# Patient Record
Sex: FEMALE | Race: ASIAN | NOT HISPANIC OR LATINO | Employment: PART TIME | URBAN - METROPOLITAN AREA
[De-identification: names, ages, dates, MRNs, and addresses within clinical notes are randomized per-mention and may not be internally consistent; named-entity substitution may affect disease eponyms.]

---

## 2018-10-02 ENCOUNTER — TRANSCRIBE ORDERS (OUTPATIENT)
Dept: ADMINISTRATIVE | Facility: HOSPITAL | Age: 63
End: 2018-10-02

## 2018-10-02 DIAGNOSIS — Z80.41 FAMILY HISTORY OF MALIGNANT NEOPLASM OF OVARY: ICD-10-CM

## 2018-10-02 DIAGNOSIS — Z12.31 VISIT FOR SCREENING MAMMOGRAM: Primary | ICD-10-CM

## 2018-10-02 DIAGNOSIS — Z80.0 FAMILY HISTORY OF MALIGNANT NEOPLASM OF GASTROINTESTINAL TRACT: ICD-10-CM

## 2018-10-22 ENCOUNTER — APPOINTMENT (OUTPATIENT)
Dept: ULTRASOUND IMAGING | Facility: HOSPITAL | Age: 63
End: 2018-10-22
Payer: COMMERCIAL

## 2018-10-22 ENCOUNTER — TRANSCRIBE ORDERS (OUTPATIENT)
Dept: ADMINISTRATIVE | Facility: HOSPITAL | Age: 63
End: 2018-10-22

## 2018-10-22 ENCOUNTER — HOSPITAL ENCOUNTER (OUTPATIENT)
Dept: BONE DENSITY | Facility: IMAGING CENTER | Age: 63
Discharge: HOME | End: 2018-10-22
Payer: COMMERCIAL

## 2018-10-22 ENCOUNTER — APPOINTMENT (OUTPATIENT)
Dept: LAB | Facility: HOSPITAL | Age: 63
End: 2018-10-22
Payer: COMMERCIAL

## 2018-10-22 ENCOUNTER — HOSPITAL ENCOUNTER (OUTPATIENT)
Dept: ULTRASOUND IMAGING | Facility: HOSPITAL | Age: 63
Discharge: HOME | End: 2018-10-22
Payer: COMMERCIAL

## 2018-10-22 DIAGNOSIS — Z80.41 FAMILY HISTORY OF MALIGNANT NEOPLASM OF OVARY: ICD-10-CM

## 2018-10-22 DIAGNOSIS — Z12.31 VISIT FOR SCREENING MAMMOGRAM: ICD-10-CM

## 2018-10-22 DIAGNOSIS — Z80.0 FAMILY HISTORY OF MALIGNANT NEOPLASM OF GASTROINTESTINAL TRACT: Primary | ICD-10-CM

## 2018-10-22 DIAGNOSIS — Z80.0 FAMILY HISTORY OF MALIGNANT NEOPLASM OF GASTROINTESTINAL TRACT: ICD-10-CM

## 2018-10-22 LAB — CANCER AG125 SERPL-ACNC: 10.9 U/ML (ref 0–30)

## 2018-10-22 PROCEDURE — 36415 COLL VENOUS BLD VENIPUNCTURE: CPT

## 2018-10-22 PROCEDURE — 86304 IMMUNOASSAY TUMOR CA 125: CPT

## 2018-10-22 PROCEDURE — 76856 US EXAM PELVIC COMPLETE: CPT

## 2018-10-22 PROCEDURE — 77067 SCR MAMMO BI INCL CAD: CPT

## 2018-10-22 PROCEDURE — 76830 TRANSVAGINAL US NON-OB: CPT

## 2019-01-02 ENCOUNTER — TRANSCRIBE ORDERS (OUTPATIENT)
Dept: ADMINISTRATIVE | Facility: HOSPITAL | Age: 64
End: 2019-01-02

## 2019-01-02 DIAGNOSIS — Z80.41 FAMILY HISTORY OF MALIGNANT NEOPLASM OF OVARY: Primary | ICD-10-CM

## 2019-01-26 ENCOUNTER — HOSPITAL ENCOUNTER (OUTPATIENT)
Dept: ULTRASOUND IMAGING | Facility: HOSPITAL | Age: 64
Discharge: HOME | End: 2019-01-26
Payer: COMMERCIAL

## 2019-01-26 DIAGNOSIS — Z80.41 FAMILY HISTORY OF MALIGNANT NEOPLASM OF OVARY: ICD-10-CM

## 2019-01-26 PROCEDURE — 76856 US EXAM PELVIC COMPLETE: CPT

## 2019-01-26 PROCEDURE — 76830 TRANSVAGINAL US NON-OB: CPT

## 2019-10-15 ENCOUNTER — TRANSCRIBE ORDERS (OUTPATIENT)
Dept: ADMINISTRATIVE | Facility: HOSPITAL | Age: 64
End: 2019-10-15

## 2019-10-15 DIAGNOSIS — N83.291 COMPLEX CYST OF RIGHT OVARY: ICD-10-CM

## 2019-10-15 DIAGNOSIS — Z80.41 FAMILY HISTORY OF MALIGNANT NEOPLASM OF OVARY: Primary | ICD-10-CM

## 2019-10-15 DIAGNOSIS — Z12.31 VISIT FOR SCREENING MAMMOGRAM: Primary | ICD-10-CM

## 2019-10-24 ENCOUNTER — APPOINTMENT (OUTPATIENT)
Dept: LAB | Facility: HOSPITAL | Age: 64
End: 2019-10-24
Payer: COMMERCIAL

## 2019-10-24 ENCOUNTER — HOSPITAL ENCOUNTER (OUTPATIENT)
Dept: ULTRASOUND IMAGING | Facility: HOSPITAL | Age: 64
Discharge: HOME | End: 2019-10-24
Payer: COMMERCIAL

## 2019-10-24 ENCOUNTER — TRANSCRIBE ORDERS (OUTPATIENT)
Dept: ADMINISTRATIVE | Facility: HOSPITAL | Age: 64
End: 2019-10-24

## 2019-10-24 DIAGNOSIS — Z80.41 FAMILY HISTORY OF MALIGNANT NEOPLASM OF OVARY: Primary | ICD-10-CM

## 2019-10-24 DIAGNOSIS — N83.291 COMPLEX CYST OF RIGHT OVARY: ICD-10-CM

## 2019-10-24 DIAGNOSIS — Z80.41 FAMILY HISTORY OF MALIGNANT NEOPLASM OF OVARY: ICD-10-CM

## 2019-10-24 LAB — CANCER AG125 SERPL-ACNC: 11.2 U/ML (ref 0–30)

## 2019-10-24 PROCEDURE — 86304 IMMUNOASSAY TUMOR CA 125: CPT

## 2019-10-24 PROCEDURE — 76830 TRANSVAGINAL US NON-OB: CPT

## 2019-10-24 PROCEDURE — 76856 US EXAM PELVIC COMPLETE: CPT

## 2019-10-24 PROCEDURE — 36415 COLL VENOUS BLD VENIPUNCTURE: CPT

## 2019-11-02 ENCOUNTER — HOSPITAL ENCOUNTER (OUTPATIENT)
Dept: MAMMOGRAPHY | Facility: CLINIC | Age: 64
Discharge: HOME | End: 2019-11-02
Payer: COMMERCIAL

## 2019-11-02 VITALS — WEIGHT: 138 LBS | BODY MASS INDEX: 26.06 KG/M2 | HEIGHT: 61 IN

## 2019-11-02 DIAGNOSIS — Z12.31 VISIT FOR SCREENING MAMMOGRAM: ICD-10-CM

## 2019-11-02 PROCEDURE — 77067 SCR MAMMO BI INCL CAD: CPT

## 2019-11-02 PROCEDURE — 77063 BREAST TOMOSYNTHESIS BI: CPT

## 2020-10-05 ENCOUNTER — TRANSCRIBE ORDERS (OUTPATIENT)
Dept: ADMINISTRATIVE | Facility: HOSPITAL | Age: 65
End: 2020-10-05

## 2020-10-05 DIAGNOSIS — Z13.71 ENCOUNTER FOR NONPROCREATIVE SCREENING FOR GENETIC DISEASE CARRIER STATUS: ICD-10-CM

## 2020-10-05 DIAGNOSIS — N83.291 COMPLEX CYST OF RIGHT OVARY: ICD-10-CM

## 2020-10-05 DIAGNOSIS — Z12.31 ENCOUNTER FOR SCREENING MAMMOGRAM FOR MALIGNANT NEOPLASM OF BREAST: Primary | ICD-10-CM

## 2020-10-05 DIAGNOSIS — Z13.71 TESTING OF FEMALE FOR GENETIC DISEASE CARRIER STATUS: ICD-10-CM

## 2020-10-05 DIAGNOSIS — N83.291 OTHER OVARIAN CYST, RIGHT SIDE: ICD-10-CM

## 2020-10-09 ENCOUNTER — LAB (OUTPATIENT)
Dept: LAB | Facility: CLINIC | Age: 65
End: 2020-10-09
Payer: COMMERCIAL

## 2020-10-09 ENCOUNTER — HOSPITAL ENCOUNTER (OUTPATIENT)
Dept: ULTRASOUND IMAGING | Facility: CLINIC | Age: 65
Discharge: HOME | End: 2020-10-09
Payer: COMMERCIAL

## 2020-10-09 DIAGNOSIS — Z13.71 ENCOUNTER FOR NONPROCREATIVE SCREENING FOR GENETIC DISEASE CARRIER STATUS: ICD-10-CM

## 2020-10-09 DIAGNOSIS — N83.291 OTHER OVARIAN CYST, RIGHT SIDE: ICD-10-CM

## 2020-10-09 DIAGNOSIS — Z13.71 TESTING OF FEMALE FOR GENETIC DISEASE CARRIER STATUS: ICD-10-CM

## 2020-10-09 DIAGNOSIS — N83.291 COMPLEX CYST OF RIGHT OVARY: ICD-10-CM

## 2020-10-09 PROCEDURE — 76830 TRANSVAGINAL US NON-OB: CPT

## 2020-10-09 PROCEDURE — 76856 US EXAM PELVIC COMPLETE: CPT

## 2020-10-09 PROCEDURE — 36415 COLL VENOUS BLD VENIPUNCTURE: CPT

## 2020-10-09 PROCEDURE — 86304 IMMUNOASSAY TUMOR CA 125: CPT

## 2020-10-10 LAB — CANCER AG125 SERPL-ACNC: 10 U/ML (ref 0–30)

## 2020-12-10 ENCOUNTER — HOSPITAL ENCOUNTER (OUTPATIENT)
Dept: BONE DENSITY | Facility: IMAGING CENTER | Age: 65
Discharge: HOME | End: 2020-12-10
Payer: COMMERCIAL

## 2020-12-10 VITALS — WEIGHT: 135 LBS | HEIGHT: 61 IN | BODY MASS INDEX: 25.49 KG/M2

## 2020-12-10 DIAGNOSIS — Z12.31 ENCOUNTER FOR SCREENING MAMMOGRAM FOR MALIGNANT NEOPLASM OF BREAST: ICD-10-CM

## 2020-12-10 PROCEDURE — 77063 BREAST TOMOSYNTHESIS BI: CPT

## 2020-12-10 PROCEDURE — 77067 SCR MAMMO BI INCL CAD: CPT

## 2021-06-11 VITALS — HEIGHT: 61 IN | WEIGHT: 130 LBS | BODY MASS INDEX: 24.55 KG/M2

## 2021-06-11 DIAGNOSIS — Z80.0 FHX: COLON CANCER: Primary | ICD-10-CM

## 2021-06-11 RX ORDER — SODIUM PICOSULFATE, MAGNESIUM OXIDE, AND ANHYDROUS CITRIC ACID 10; 3.5; 12 MG/160ML; G/160ML; G/160ML
LIQUID ORAL
Qty: 320 ML | Refills: 0 | Status: SHIPPED | OUTPATIENT
Start: 2021-06-11 | End: 2021-06-18 | Stop reason: HOSPADM

## 2021-06-11 RX ORDER — OMEGA-3-ACID ETHYL ESTERS 1 G/1
CAPSULE, LIQUID FILLED ORAL EVERY 12 HOURS
COMMUNITY

## 2021-06-11 RX ORDER — SEMAGLUTIDE 1.34 MG/ML
INJECTION, SOLUTION SUBCUTANEOUS
COMMUNITY

## 2021-06-11 RX ORDER — LOSARTAN POTASSIUM 25 MG/1
TABLET ORAL EVERY 24 HOURS
COMMUNITY

## 2021-06-11 RX ORDER — METFORMIN HYDROCHLORIDE 750 MG/1
TABLET, EXTENDED RELEASE ORAL EVERY 24 HOURS
COMMUNITY

## 2021-06-11 NOTE — TELEPHONE ENCOUNTER
Why does your doctor want you to have this procedure? Fhx colon ca-brother    Do you have kidney disease?  no  If yes, are you on dialysis :     Have you had diverticulitis within the past 2 months? no    Are you diabetic?  yes  If yes, insulin dependent: NIDDM  If yes, provide diabetic instructions sheet     Do take iron supplements?  no  If yes, instruct patient to hold iron supplement for 7 days prior    Are you on a blood thinner? no   Was the blood thinner sheet complete and faxed to cardiologist no  Plavix (clopidogrel), Coumadin (warfarin), Lovenox (enoxaparin), Xarelto (rivaroxaban), Pradaxa(dabigatran), Eliquis(apixaban) Savaysa/Lixiana (edoxapan)    Do you have an automatic implantable cardiac defibrillator (AICD)/pacemaker ([facility])? no  Was AICD/pacemaker sheet completed and faxed to cardiologist? no    Are you on home oxygen? no  If yes, continuous or nocturnal:     Have you been treated for MRSA, VRE or any communicable diseases? no    Heart attack, stroke, or stent within 3 months? no  Schedule at Hospital if within 3-6 months   Use nitroglycerin for chest pain in the last 6 months? no    History of organ  transplant?  no   If yes, notify Endo      History of neck/throat/tongue surgery or cancer? no  IF yes, notify Endo      Any problems with anesthesia in the past? no     Was stool C diff ordered?  no Stool specimen needs to be completed prior to procedure    Do have any facial or body piercings?no     Do you have a latex allergy? no     Do have an allergy to metals? (Bravo study only) no     If pediatric patient, was consent faxed to pediatrician no     Patient rights reviewed yes     Colon phone prep completed; clenpiq instructions reviewed and emailed to pt; rx forwarded to provider

## 2021-06-18 ENCOUNTER — ANESTHESIA (OUTPATIENT)
Dept: GASTROENTEROLOGY | Facility: AMBULATORY SURGERY CENTER | Age: 66
End: 2021-06-18

## 2021-06-18 ENCOUNTER — ANESTHESIA EVENT (OUTPATIENT)
Dept: GASTROENTEROLOGY | Facility: AMBULATORY SURGERY CENTER | Age: 66
End: 2021-06-18

## 2021-06-18 ENCOUNTER — HOSPITAL ENCOUNTER (OUTPATIENT)
Dept: GASTROENTEROLOGY | Facility: AMBULATORY SURGERY CENTER | Age: 66
Discharge: HOME | End: 2021-06-18
Payer: COMMERCIAL

## 2021-06-18 VITALS
OXYGEN SATURATION: 98 % | HEART RATE: 72 BPM | TEMPERATURE: 97.4 F | RESPIRATION RATE: 23 BRPM | DIASTOLIC BLOOD PRESSURE: 87 MMHG | SYSTOLIC BLOOD PRESSURE: 117 MMHG

## 2021-06-18 DIAGNOSIS — Z80.0 FAMILY HISTORY OF COLON CANCER: ICD-10-CM

## 2021-06-18 PROCEDURE — G0105 COLORECTAL SCRN; HI RISK IND: HCPCS | Performed by: INTERNAL MEDICINE

## 2021-06-18 RX ORDER — PROPOFOL 10 MG/ML
INJECTION, EMULSION INTRAVENOUS CONTINUOUS PRN
Status: DISCONTINUED | OUTPATIENT
Start: 2021-06-18 | End: 2021-06-18

## 2021-06-18 RX ORDER — PROPOFOL 10 MG/ML
INJECTION, EMULSION INTRAVENOUS AS NEEDED
Status: DISCONTINUED | OUTPATIENT
Start: 2021-06-18 | End: 2021-06-18

## 2021-06-18 RX ORDER — LIDOCAINE HYDROCHLORIDE 10 MG/ML
INJECTION, SOLUTION EPIDURAL; INFILTRATION; INTRACAUDAL; PERINEURAL AS NEEDED
Status: DISCONTINUED | OUTPATIENT
Start: 2021-06-18 | End: 2021-06-18

## 2021-06-18 RX ORDER — SODIUM CHLORIDE, SODIUM LACTATE, POTASSIUM CHLORIDE, CALCIUM CHLORIDE 600; 310; 30; 20 MG/100ML; MG/100ML; MG/100ML; MG/100ML
50 INJECTION, SOLUTION INTRAVENOUS CONTINUOUS
Status: DISCONTINUED | OUTPATIENT
Start: 2021-06-18 | End: 2021-06-22 | Stop reason: HOSPADM

## 2021-06-18 RX ADMIN — SODIUM CHLORIDE, SODIUM LACTATE, POTASSIUM CHLORIDE, CALCIUM CHLORIDE 50 ML/HR: 600; 310; 30; 20 INJECTION, SOLUTION INTRAVENOUS at 08:44

## 2021-06-18 RX ADMIN — SODIUM CHLORIDE, SODIUM LACTATE, POTASSIUM CHLORIDE, CALCIUM CHLORIDE: 600; 310; 30; 20 INJECTION, SOLUTION INTRAVENOUS at 09:05

## 2021-06-18 RX ADMIN — LIDOCAINE HYDROCHLORIDE 50 MG: 10 INJECTION, SOLUTION EPIDURAL; INFILTRATION; INTRACAUDAL; PERINEURAL at 09:09

## 2021-06-18 RX ADMIN — PROPOFOL 80 MCG/KG/MIN: 10 INJECTION, EMULSION INTRAVENOUS at 09:09

## 2021-06-18 RX ADMIN — PROPOFOL 100 MG: 10 INJECTION, EMULSION INTRAVENOUS at 09:09

## 2021-06-18 NOTE — ANESTHESIA POSTPROCEDURE EVALUATION
Post-Op Assessment Note    CV Status:  Stable  Pain Score: 0    Pain management: adequate     Mental Status:  Alert and awake   Hydration Status:  Euvolemic   PONV Controlled:  Controlled   Airway Patency:  Patent      Post Op Vitals Reviewed: Yes      Staff: CRNA         No complications documented      BP   103/61   Temp     Pulse  88   Resp   17   SpO2   98% Pt now rates pain as 5/10, appears comfortable.

## 2021-06-18 NOTE — H&P
History and Physical - [facility]  Nancy Mcintosh 61 y o  female MRN: [id number]    HPI: Nancy Mcintosh is a 61y o  year old female who presents for colonoscopy for family history of colon cancer    REVIEW OF SYSTEMS: Per the HPI, and otherwise unremarkable      Historical Information   Past Medical History:   Diagnosis Date    Diabetes mellitus ([address] )     Hyperlipidemia     Hypertension      Past Surgical History:   Procedure Laterality Date    COLONOSCOPY      HYSTERECTOMY       Social History   Social History     Substance and Sexual Activity   Alcohol Use Yes     Social History     Substance and Sexual Activity   Drug Use Never     Social History     Tobacco Use   Smoking Status Never Smoker   Smokeless Tobacco Never Used     Family History   Problem Relation Age of Onset    Ovarian cancer Mother 61    No Known Problems Father     No Known Problems Sister     No Known Problems Maternal Grandmother     No Known Problems Maternal Grandfather     No Known Problems Paternal Grandmother     No Known Problems Paternal Grandfather     No Known Problems Sister     No Known Problems Sister     No Known Problems Sister     No Known Problems Sister     No Known Problems Maternal Aunt     No Known Problems Maternal Aunt     No Known Problems Maternal Aunt     No Known Problems Maternal Aunt     No Known Problems Maternal Aunt     No Known Problems Maternal Aunt     No Known Problems Paternal Aunt     No Known Problems Paternal Aunt     Colon cancer Brother 39       Meds/Allergies       Current Outpatient Medications:     losartan (COZAAR) 25 mg tablet    metFORMIN (GLUCOPHAGE-XR) 750 mg 24 hr tablet    omega-3-acid ethyl esters (LOVAZA) 1 g capsule    Rosuvastatin Calcium 10 MG CPSP    Sod Picosulfate-Mag Ox-Cit Acd (Clenpiq) 10-3 5-12 MG-GM -GM/160ML SOLN    Semaglutide,0 25 or 0 5MG/DOS, (Ozempic, 0 25 or 0 5 MG/DOSE,) 2 MG/1 5ML SOPN    Current Facility-Administered Medications:   lactated ringers infusion, 50 mL/hr, Intravenous, Continuous, 50 mL/hr at 06/18/21 0844    No Known Allergies    Objective     /91   Pulse 68   Temp (!) 97 4 °F (36 3 °C) (Temporal)   Resp 13   SpO2 99%     PHYSICAL EXAM    Gen: NAD AAOx3  Head: Normocephalic, Atraumatic  CV: S1S2 RRR no m/r/g  CHEST: Clear b/l no c/r/w  ABD: soft, +BS NT/ND  EXT: no edema    ASSESSMENT/PLAN:  This is a 61y o  year old female here for colonoscopy, and she is stable and optimized for her procedure

## 2021-06-18 NOTE — DISCHARGE INSTRUCTIONS
Colonoscopy   WHAT YOU NEED TO KNOW:   A colonoscopy is a procedure to examine the inside of your colon (intestine) with a scope  Polyps or tissue growths may have been removed during your colonoscopy  It is normal to feel bloated and to have some abdominal discomfort  You should be passing gas  If you have hemorrhoids or you had polyps removed, you may have a small amount of bleeding  DISCHARGE INSTRUCTIONS:   Seek care immediately if:    You have sudden, severe abdominal pain   You have problems swallowing   You have a large amount of black, sticky bowel movements or blood in your bowel movements   You have sudden trouble breathing   You feel weak, lightheaded, or faint or your heart beats faster than normal for you  Contact your healthcare provider if:    You have a fever and chills   You have nausea or are vomiting   Your abdomen is bloated or feels full and hard   You have abdominal pain   You have black, sticky bowel movements or blood in your bowel movements   You have not had a bowel movement for 3 days after your procedure   You have rash or hives   You have questions or concerns about your procedure  Activity:    Do not lift, strain, or run for 24 hours after your procedure   Rest after your procedure  You have been given medicine to relax you  Do not drive or make important decisions until the day after your procedure  Return to your normal activity as directed   Relieve gas and discomfort from bloating by lying on your right side with a heating pad on your abdomen  You may need to take short walks to help the gas move out  Eat small meals until bloating is relieved  Follow up with your healthcare provider as directed: Write down your questions so you remember to ask them during your visits  If you take a blood thinner, please review the specific instructions from your endoscopist about when you should resume it   These can be found in the Recommendation and Your Medication list sections of this After Visit Summary  Diverticulosis   WHAT YOU NEED TO KNOW:   What is diverticulosis? Diverticulosis is a condition that causes small pockets called diverticula to form in your intestine  These pockets make it difficult for bowel movements to pass through your digestive system  What causes diverticulosis? Diverticula form when muscles have to work hard to move bowel movements through the intestine  The force causes bulges to form at weak areas in the intestine  This may happen if you eat foods that are low in fiber  Fiber helps give your bowel movements more bulk so they are larger and easier to move through your colon  The following may increase your risk of diverticulosis:  · A history of constipation    · Age 36 or older    · Obesity    · Lack of exercise    What are the signs and symptoms of diverticulosis? Diverticulosis usually does not cause any signs or symptoms  It may cause any of the following in some people:  · Pain or discomfort in your lower abdomen    · Abdominal bloating    · Constipation or diarrhea    How is diverticulosis diagnosed? Your healthcare provider will examine you and ask about your bowel movements, diet, and symptoms  He or she will also ask about any medical conditions you have or medicines you take  You may need any of the following:  · Blood tests  may be done to check for signs of inflammation  · A barium enema  is an x-ray of your colon that may show diverticula  A tube is put into your anus, and a liquid called barium is put through the tube  Barium is used so that healthcare providers can see your colon more clearly  · Flexible sigmoidoscopy  is a test to look for any changes in your lower intestines and rectum  It may also show the cause of any bleeding or pain  A soft, bendable tube with a light on the end will be put into your anus  It will then be moved forward into your intestine  · A colonoscopy  is used to look at your whole colon  A scope (long bendable tube with a light on the end) is used to take pictures  This test may show diverticula  · A CT scan , or CAT scan, may show diverticula  You may be given contrast liquid before the scan  Tell the healthcare provider if you have ever had an allergic reaction to contrast liquid  How is diverticulosis managed? The goal of treatment is to manage any symptoms you have and prevent other problems such as diverticulitis  Diverticulitis is swelling or infection of the diverticula  Your healthcare provider may recommend any of the following:  · Eat a variety of high-fiber foods  High-fiber foods help you have regular bowel movements  High-fiber foods include cooked beans, fruits, vegetables, and some cereals  Most adults need 25 to 35 grams of fiber each day  Your healthcare provider may recommend that you have more  Ask your healthcare provider how much fiber you need  Increase fiber slowly  You may have abdominal discomfort, bloating, and gas if you add fiber to your diet too quickly  You may need to take a fiber supplement if you are not getting enough fiber from food  · Medicines  to soften your bowel movements may be given  You may also need medicines to treat symptoms such as bloating and pain  · Drink liquids as directed  You may need to drink 2 to 3 liters (8 to 12 cups) of liquids every day  Ask your healthcare provider how much liquid to drink each day and which liquids are best for you  · Apply heat  on your abdomen for 20 to 30 minutes every 2 hours for as many days as directed  Heat helps decrease pain and muscle spasms  How can I help prevent diverticulitis or other symptoms? The following may help decrease your risk for diverticulitis or symptoms, such as bleeding  Talk to your provider about these or other things you can do to prevent problems that may occur with diverticulosis  · Exercise regularly  Ask your healthcare provider about the best exercise plan for you  Exercise can help you have regular bowel movements  Get 30 minutes of exercise on most days of the week  · Maintain a healthy weight  Ask your healthcare provider how much you should weigh  Ask him or her to help you create a weight loss plan if you are overweight  · Do not smoke  Nicotine and other chemicals in cigarettes increase your risk for diverticulitis  Ask your healthcare provider for information if you currently smoke and need help to quit  E-cigarettes or smokeless tobacco still contain nicotine  Talk to your healthcare provider before you use these products  · Ask your healthcare provider if it is safe to take NSAIDs  NSAIDs may increase your risk of diverticulitis  When should I seek immediate care? · You have severe pain on the left side of your lower abdomen  · Your bowel movements are bright or dark red  When should I contact my healthcare provider? · You have a fever and chills  · You feel dizzy or lightheaded  · You have nausea, or you are vomiting  · You have a change in your bowel movements  · You have questions or concerns about your condition or care  CARE AGREEMENT:   You have the right to help plan your care  Learn about your health condition and how it may be treated  Discuss treatment options with your healthcare providers to decide what care you want to receive  You always have the right to refuse treatment  The above information is an  only  It is not intended as medical advice for individual conditions or treatments  Talk to your doctor, nurse or pharmacist before following any medical regimen to see if it is safe and effective for you  © Copyright [address] Information is for End User's use only and may not be sold, redistributed or otherwise used for commercial purposes   All illustrations and images included in CareNotes® are the copyrighted property of [employer]  or Tano [employer]   Hemorrhoids   WHAT YOU NEED TO KNOW:   What are hemorrhoids? Hemorrhoids are swollen blood vessels inside your rectum (internal hemorrhoids) or on your anus (external hemorrhoids)  Sometimes a hemorrhoid may prolapse  This means it extends out of your anus  What increases my risk for hemorrhoids? · Pregnancy or obesity    · Straining or sitting for a long time during bowel movements    · Liver disease    · Weak muscles around the anus caused by older age, rectal surgery, or anal intercourse    · A lack of physical activity    · Chronic diarrhea or constipation    · A low-fiber diet    What are the signs and symptoms of hemorrhoids? · Pain or itching around your anus or inside your rectum    · Swelling or bumps around your anus    · Bright red blood in your bowel movement, on the toilet paper, or in the toilet bowl    · Tissue bulging out of your anus (prolapsed hemorrhoids)    · Incontinence (poor control over urine or bowel movements)    How are hemorrhoids diagnosed? Your healthcare provider will ask about your symptoms, the foods you eat, and your bowel movements  He or she will examine your anus for external hemorrhoids  You may need the following:  · A digital rectal exam  is a test to check for hemorrhoids  Your healthcare provider will put a gloved finger inside your anus to feel for the hemorrhoids  · An anoscopy  is a test that uses a scope (small tube with a light and camera on the end) to look at your hemorrhoids  How are hemorrhoids treated? Treatment will depend on your symptoms  You may need any of the following:  · Medicines  can help decrease pain and swelling, and soften your bowel movement  The medicine may be a pill, pad, cream, or ointment  · Procedures  may be used to shrink or remove your hemorrhoid  Examples include rubber-band ligation, sclerotherapy, and photocoagulation   These procedures may be done in your healthcare provider's office  Ask your healthcare provider for more information about these procedures  · Surgery  may be needed to shrink or remove your hemorrhoids  How can I manage my symptoms? · Apply ice on your anus for 15 to 20 minutes every hour or as directed  Use an ice pack, or put crushed ice in a plastic bag  Cover it with a towel before you apply it to your anus  Ice helps prevent tissue damage and decreases swelling and pain  · Take a sitz bath  Fill a bathtub with 4 to 6 inches of warm water  You may also use a sitz bath pan that fits inside a toilet bowl  Sit in the sitz bath for 15 minutes  Do this 3 times a day, and after each bowel movement  The warm water can help decrease pain and swelling  · Keep your anal area clean  Gently wash the area with warm water daily  Soap may irritate the area  After a bowel movement, wipe with moist towelettes or wet toilet paper  Dry toilet paper can irritate the area  How can I help prevent hemorrhoids? · Do not strain to have a bowel movement  Do not sit on the toilet too long  These actions can increase pressure on the tissues in your rectum and anus  · Drink plenty of liquids  Liquids can help prevent constipation  Ask how much liquid to drink each day and which liquids are best for you  · Eat a variety of high-fiber foods  Examples include fruits, vegetables, and whole grains  Ask your healthcare provider how much fiber you need each day  You may need to take a fiber supplement  · Exercise as directed  Exercise, such as walking, may make it easier to have a bowel movement  Ask your healthcare provider to help you create an exercise plan  · Do not have anal sex  Anal sex can weaken the skin around your rectum and anus  · Avoid heavy lifting  This can cause straining and increase your risk for another hemorrhoid  When should I seek immediate care? · You have severe pain in your rectum or around your anus      · You have severe pain in your abdomen and you are vomiting  · You have bleeding from your anus that soaks through your underwear  When should I contact my healthcare provider? · You have frequent and painful bowel movements  · Your hemorrhoid looks or feels more swollen than usual      · You do not have a bowel movement for 2 days or more  · You see or feel tissue coming through your anus  · You have questions or concerns about your condition or care  CARE AGREEMENT:   You have the right to help plan your care  Learn about your health condition and how it may be treated  Discuss treatment options with your healthcare providers to decide what care you want to receive  You always have the right to refuse treatment  The above information is an  only  It is not intended as medical advice for individual conditions or treatments  Talk to your doctor, nurse or pharmacist before following any medical regimen to see if it is safe and effective for you  © Copyright [address] Information is for End User's use only and may not be sold, redistributed or otherwise used for commercial purposes   All illustrations and images included in CareNotes® are the copyrighted property of [employer]  or [address] [place] [address]

## 2021-06-18 NOTE — ANESTHESIA PREPROCEDURE EVALUATION
Procedure:  COLONOSCOPY    Relevant Problems   CARDIO   (+) Hyperlipidemia   (+) Hypertension      Other   (+) Diabetes mellitus ([facility] [address] )        Physical Exam    Airway    Mallampati score: II  TM Distance: >3 FB  Neck ROM: full     Dental   upper dentures,     Cardiovascular  Rhythm: regular, Rate: normal, Cardiovascular exam normal    Pulmonary  Pulmonary exam normal Breath sounds clear to auscultation,     Other Findings        Anesthesia Plan  ASA Score- 2     Anesthesia Type- IV sedation with anesthesia with ASA Monitors  Additional Monitors:   Airway Plan:           Plan Factors-Exercise tolerance (METS): >4 METS  Chart reviewed  Patient summary reviewed  Patient is not a current smoker  Induction- intravenous  Postoperative Plan-     Informed Consent- Anesthetic plan and risks discussed with patient  I personally reviewed this patient with the CRNA  Discussed and agreed on the Anesthesia Plan with the CRNA  Cullen Hale

## 2021-10-14 ENCOUNTER — TELEPHONE (OUTPATIENT)
Dept: OBGYN CLINIC | Facility: CLINIC | Age: 66
End: 2021-10-14

## 2021-10-14 DIAGNOSIS — Z12.31 ENCOUNTER FOR SCREENING MAMMOGRAM FOR MALIGNANT NEOPLASM OF BREAST: ICD-10-CM

## 2021-10-14 DIAGNOSIS — N83.291 OTHER OVARIAN CYST, RIGHT SIDE: Primary | ICD-10-CM

## 2021-12-17 PROBLEM — Z90.710 HISTORY OF HYSTERECTOMY: Status: ACTIVE | Noted: 2021-12-17

## 2021-12-17 PROBLEM — Z13.71 BRCA NEGATIVE: Status: ACTIVE | Noted: 2021-12-17

## 2021-12-17 PROBLEM — Z80.0 FAMILY HISTORY OF COLON CANCER: Status: ACTIVE | Noted: 2021-12-17

## 2021-12-17 PROBLEM — Z80.41 FAMILY HISTORY OF OVARIAN CANCER: Status: ACTIVE | Noted: 2021-12-17

## 2021-12-17 PROBLEM — Z87.42 HISTORY OF OVARIAN CYST: Status: ACTIVE | Noted: 2021-12-17

## 2021-12-21 ENCOUNTER — ANNUAL EXAM (OUTPATIENT)
Dept: OBGYN CLINIC | Facility: CLINIC | Age: 66
End: 2021-12-21

## 2021-12-21 VITALS
BODY MASS INDEX: 23.98 KG/M2 | SYSTOLIC BLOOD PRESSURE: 126 MMHG | DIASTOLIC BLOOD PRESSURE: 80 MMHG | WEIGHT: 127 LBS | HEIGHT: 61 IN

## 2021-12-21 DIAGNOSIS — Z78.0 ASYMPTOMATIC MENOPAUSAL STATE: ICD-10-CM

## 2021-12-21 DIAGNOSIS — Z01.419 ENCOUNTER FOR GYNECOLOGICAL EXAMINATION (GENERAL) (ROUTINE) WITHOUT ABNORMAL FINDINGS: Primary | ICD-10-CM

## 2021-12-21 DIAGNOSIS — Z80.41 FAMILY HISTORY OF OVARIAN CANCER: ICD-10-CM

## 2021-12-21 DIAGNOSIS — Z12.31 ENCOUNTER FOR SCREENING MAMMOGRAM FOR BREAST CANCER: ICD-10-CM

## 2021-12-21 DIAGNOSIS — Z80.0 FAMILY HISTORY OF COLON CANCER: ICD-10-CM

## 2021-12-21 DIAGNOSIS — Z90.710 HISTORY OF HYSTERECTOMY: ICD-10-CM

## 2021-12-21 DIAGNOSIS — Z13.820 OSTEOPOROSIS SCREENING: ICD-10-CM

## 2021-12-21 DIAGNOSIS — Z13.71 BRCA NEGATIVE: ICD-10-CM

## 2021-12-21 DIAGNOSIS — Z87.42 HISTORY OF OVARIAN CYST: ICD-10-CM

## 2021-12-21 PROCEDURE — 99396 PREV VISIT EST AGE 40-64: CPT | Performed by: OBSTETRICS & GYNECOLOGY

## 2022-10-10 ENCOUNTER — TELEPHONE (OUTPATIENT)
Dept: OBGYN CLINIC | Facility: CLINIC | Age: 67
End: 2022-10-10

## 2022-10-10 DIAGNOSIS — Z80.41 FAMILY HISTORY OF OVARIAN CANCER: Primary | ICD-10-CM

## 2022-10-10 NOTE — TELEPHONE ENCOUNTER
Order placed, look on nursing printer please  Not due until Dec    Also due for well check in December, please have pt schedule    Thanks

## 2022-10-10 NOTE — TELEPHONE ENCOUNTER
Sent message to [employer] desk to print CA-125 order and have pt schedule December [place]  Maury Gil

## 2022-10-10 NOTE — TELEPHONE ENCOUNTER
Chelsea Felix walked into [facility] to  her Mammo & Dexa Scan orders  Chelsea Felix was also requesting CA-125 lab orders  Will send message to Dr Luan Bhatia

## 2022-10-10 NOTE — TELEPHONE ENCOUNTER
10/10/22-Pt. Stopped in looking for Mammo Rx and Dexa Rx, printed both. She also asked about a CA-125 order.  Please follow up.  Thank you.

## 2022-10-14 ENCOUNTER — APPOINTMENT (OUTPATIENT)
Dept: LAB | Facility: HOSPITAL | Age: 67
End: 2022-10-14
Attending: OBSTETRICS & GYNECOLOGY
Payer: COMMERCIAL

## 2022-10-14 ENCOUNTER — HOSPITAL ENCOUNTER (OUTPATIENT)
Dept: ULTRASOUND IMAGING | Facility: HOSPITAL | Age: 67
End: 2022-10-14
Attending: OBSTETRICS & GYNECOLOGY
Payer: COMMERCIAL

## 2022-10-14 DIAGNOSIS — N83.291 OTHER OVARIAN CYST, RIGHT SIDE: ICD-10-CM

## 2022-10-14 DIAGNOSIS — Z80.41 FAMILY HISTORY OF OVARIAN CANCER: ICD-10-CM

## 2022-10-14 LAB — CANCER AG125 SERPL-ACNC: 9.7 U/ML (ref 0–30)

## 2022-10-14 PROCEDURE — 86304 IMMUNOASSAY TUMOR CA 125: CPT

## 2022-10-14 PROCEDURE — 76830 TRANSVAGINAL US NON-OB: CPT

## 2022-10-14 PROCEDURE — 76856 US EXAM PELVIC COMPLETE: CPT

## 2022-10-14 PROCEDURE — 36415 COLL VENOUS BLD VENIPUNCTURE: CPT

## 2022-10-19 ENCOUNTER — TELEPHONE (OUTPATIENT)
Dept: OBGYN CLINIC | Facility: CLINIC | Age: 67
End: 2022-10-19

## 2022-10-27 ENCOUNTER — HOSPITAL ENCOUNTER (OUTPATIENT)
Dept: BONE DENSITY | Facility: CLINIC | Age: 67
Discharge: HOME | End: 2022-10-27
Payer: COMMERCIAL

## 2022-10-27 ENCOUNTER — HOSPITAL ENCOUNTER (OUTPATIENT)
Dept: MAMMOGRAPHY | Facility: CLINIC | Age: 67
Discharge: HOME | End: 2022-10-27
Payer: COMMERCIAL

## 2022-10-27 VITALS — HEIGHT: 61 IN | BODY MASS INDEX: 23.6 KG/M2 | WEIGHT: 125 LBS

## 2022-10-27 DIAGNOSIS — Z13.820 OSTEOPOROSIS SCREENING: ICD-10-CM

## 2022-10-27 DIAGNOSIS — Z12.31 ENCOUNTER FOR SCREENING MAMMOGRAM FOR BREAST CANCER: ICD-10-CM

## 2022-10-27 DIAGNOSIS — Z78.0 ASYMPTOMATIC MENOPAUSAL STATE: ICD-10-CM

## 2022-10-27 PROCEDURE — 77063 BREAST TOMOSYNTHESIS BI: CPT

## 2022-10-27 PROCEDURE — 77080 DXA BONE DENSITY AXIAL: CPT

## 2022-10-27 PROCEDURE — 77067 SCR MAMMO BI INCL CAD: CPT

## 2023-01-07 NOTE — PROGRESS NOTES
Assessment/Plan:    Encounter for gynecological examination (general) (routine) without abnormal findings  All well, no complaints  Normal breast and pelvic exams  Questionable right thyroid nodule on exam, u/s ordered  S/p hysterectomy  Mammo order given, last 10/27/22  Colonoscopy 2021, due 2026  Dexa 10/27/22, WNL  Repeat 5 years    Family history of ovarian cancer - mother in 62s  Discussed with the patient the lack of screening tests available for ovarian cancer  No data to support pelvic ultrasound or ca-125 "screening" as a method to detect disease earlier or change prognosis after diagnosis  Recommend awareness of symptoms and to contact for new persistent abdominal or pelvic pain, new progressive bloating, problems eating (early satiety, getting full too fast), or constant urinary pressure  Would like to continue with annual ca125  Orders given    History of right ovarian cyst - stable  Stable, nonsuspicious cyst last imaged 10/2022  Will continue with annual u/s, orders given  Diagnoses and all orders for this visit:    Encounter for gynecological examination (general) (routine) without abnormal findings    Breast cancer screening by mammogram  -     Mammo screening bilateral w 3d & cad; Future    History of right ovarian cyst - stable  -     US pelvis complete w transvaginal; Future    Thyroid nodule  -     US thyroid; Future    Family history of ovarian cancer - mother in 62s  -     CA 80; Future  -         Neoplasm of uncertain behavior of pelvis  -     ; Future  -         [facility] - [facility]    Other orders  -     clobetasol (TEMOVATE) 0 05 % external solution; APPLY SOLUTION TO SCALP TOPICALLY EVERY OTHER DAY  -     cyanocobalamin 1,000 mcg/mL; INJECT 1000 MCG INTRAMUSCULARLY MONTHLY          Subjective:      Patient ID: Lexi Munroe is a 74 y o  female  HPI Here for well check        The following portions of the patient's history were reviewed and updated as appropriate:   She  has a past medical history of Diabetes mellitus ([address] ), Hyperlipidemia, and Hypertension  She   Patient Active Problem List    Diagnosis Date Noted   • Encounter for gynecological examination (general) (routine) without abnormal findings 12/21/2021   • History of hysterectomy 12/17/2021   • BRCA negative - Negative [facility] 1/2018 12/17/2021   • Family history of ovarian cancer - mother in 62s 12/17/2021   • Family history of colon cancer - brother in 45s 12/17/2021   • History of right ovarian cyst - stable 12/17/2021   • Diabetes mellitus ([address] )    • Hyperlipidemia    • Hypertension      She  has a past surgical history that includes Colonoscopy (06/2021); Hysterectomy; and Mammo (historical) (Bilateral, 12/13/2021)  Her family history includes Colon cancer (age of onset: 39) in her brother; Diabetes in her maternal grandfather, maternal grandmother, and mother; Heart disease in her sister; Hypertension in her maternal grandfather and maternal grandmother; Kidney failure in her father; No Known Problems in her brother, brother, brother, maternal aunt, maternal aunt, maternal aunt, maternal aunt, maternal aunt, maternal aunt, paternal aunt, paternal aunt, paternal grandfather, paternal grandmother, sister, sister, sister, sister, son, and son; Ovarian cancer (age of onset: 61) in her mother  She  reports that she has never smoked  She has never used smokeless tobacco  She reports current alcohol use  She reports that she does not use drugs    Current Outpatient Medications   Medication Sig Dispense Refill   • clobetasol (TEMOVATE) 0 05 % external solution APPLY SOLUTION TO SCALP TOPICALLY EVERY OTHER DAY     • cyanocobalamin 1,000 mcg/mL INJECT 1000 MCG INTRAMUSCULARLY MONTHLY     • losartan (COZAAR) 25 mg tablet every 24 hours     • metFORMIN (GLUCOPHAGE-XR) 750 mg 24 hr tablet every 24 hours     • omega-3-acid ethyl esters (LOVAZA) 1 g capsule Every 12 hours     • Rosuvastatin Calcium 10 MG CPSP every 24 hours     • Semaglutide,0 25 or 0 5MG/DOS, (Ozempic, 0 25 or 0 5 MG/DOSE,) 2 MG/1 5ML SOPN        No current facility-administered medications for this visit  She has No Known Allergies       Review of Systems  No breast, bladder, bowel changes   No new persistent pain, bloating, early satiety or pelvic pressure      Objective:      /68 (BP Location: Left arm, Patient Position: Sitting, Cuff Size: Standard)   Ht 5' 0 5" (1 537 m)   Wt 56 2 kg (124 lb)   BMI 23 82 kg/m²          Physical Exam    General appearance: no distress, pleasant  Neck: thyroid with questionable right nodule, no thyromegaly, no palpable adenopathy  Lymph nodes: no palpable adenopathy  Breasts: no masses, nodes or skin changes  Abdomen: soft, non tender, no palpable masses  Pelvic exam: normal atrophic external genitalia, stable right bartholin cyst, nontender, urethral meatus normal, vagina atrophic without lesions, cuff intact, no adnexal masses, non tender  Rectal exam: normal sphincter tone, no masses, RV confirms above

## 2023-01-13 ENCOUNTER — ANNUAL EXAM (OUTPATIENT)
Dept: OBGYN CLINIC | Facility: CLINIC | Age: 68
End: 2023-01-13

## 2023-01-13 VITALS
WEIGHT: 124 LBS | DIASTOLIC BLOOD PRESSURE: 68 MMHG | BODY MASS INDEX: 23.41 KG/M2 | SYSTOLIC BLOOD PRESSURE: 102 MMHG | HEIGHT: 61 IN

## 2023-01-13 DIAGNOSIS — Z01.419 ENCOUNTER FOR GYNECOLOGICAL EXAMINATION (GENERAL) (ROUTINE) WITHOUT ABNORMAL FINDINGS: Primary | ICD-10-CM

## 2023-01-13 DIAGNOSIS — Z12.31 BREAST CANCER SCREENING BY MAMMOGRAM: ICD-10-CM

## 2023-01-13 DIAGNOSIS — Z80.41 FAMILY HISTORY OF OVARIAN CANCER: ICD-10-CM

## 2023-01-13 DIAGNOSIS — Z13.71 BRCA NEGATIVE: ICD-10-CM

## 2023-01-13 DIAGNOSIS — E04.1 THYROID NODULE: ICD-10-CM

## 2023-01-13 DIAGNOSIS — Z87.42 HISTORY OF OVARIAN CYST: ICD-10-CM

## 2023-01-13 DIAGNOSIS — D48.7: ICD-10-CM

## 2023-01-13 RX ORDER — CLOBETASOL PROPIONATE 0.46 MG/ML
SOLUTION TOPICAL
COMMUNITY
Start: 2022-10-19

## 2023-01-13 RX ORDER — CYANOCOBALAMIN 1000 UG/ML
INJECTION, SOLUTION INTRAMUSCULAR; SUBCUTANEOUS
COMMUNITY
Start: 2022-12-13

## 2023-01-13 NOTE — LETTER
January 13, 2023     Chiquis Syed MD  [facility]    Patient: Ismael Boston   YOB: 1955   Date of Visit: 1/13/2023       Dear Dr Katarzyna Daly: Thank you for referring Blanca Noyola to me for evaluation  Below are my notes for this consultation  If you have questions, please do not hesitate to call me  I look forward to following your patient along with you  Sincerely,        Kulwant Siddiqi MD        CC: No Recipients  Kulwant Siddiqi MD  1/13/2023 10:29 AM  Sign when Signing Visit  Assessment/Plan:    Encounter for gynecological examination (general) (routine) without abnormal findings  All well, no complaints  Normal breast and pelvic exams  Questionable right thyroid nodule on exam, u/s ordered  S/p hysterectomy  Mammo order given, last 10/27/22  Colonoscopy 2021, due 2026  Dexa 10/27/22, WNL  Repeat 5 years    Family history of ovarian cancer - mother in 62s  Discussed with the patient the lack of screening tests available for ovarian cancer  No data to support pelvic ultrasound or ca-125 "screening" as a method to detect disease earlier or change prognosis after diagnosis  Recommend awareness of symptoms and to contact for new persistent abdominal or pelvic pain, new progressive bloating, problems eating (early satiety, getting full too fast), or constant urinary pressure  Would like to continue with annual ca125  Orders given    History of right ovarian cyst - stable  Stable, nonsuspicious cyst last imaged 10/2022  Will continue with annual u/s, orders given  Diagnoses and all orders for this visit:    Encounter for gynecological examination (general) (routine) without abnormal findings    Breast cancer screening by mammogram  -     Mammo screening bilateral w 3d & cad; Future    History of right ovarian cyst - stable  -     US pelvis complete w transvaginal; Future    Thyroid nodule  -     US thyroid;  Future    Family history of ovarian cancer - mother in 62s  - ; Future  -         Neoplasm of uncertain behavior of pelvis  -     ; Future  -         BRCA negative - Negative [facility] 1/2018    Other orders  -     clobetasol (TEMOVATE) 0 05 % external solution; APPLY SOLUTION TO SCALP TOPICALLY EVERY OTHER DAY  -     cyanocobalamin 1,000 mcg/mL; INJECT 1000 MCG INTRAMUSCULARLY MONTHLY         Subjective:     Patient ID: Latasha Small is a 74 y o  female  HPI Here for well check  The following portions of the patient's history were reviewed and updated as appropriate:   She  has a past medical history of Diabetes mellitus ([address] ), Hyperlipidemia, and Hypertension  She   Patient Active Problem List    Diagnosis Date Noted   • Encounter for gynecological examination (general) (routine) without abnormal findings 12/21/2021   • History of hysterectomy 12/17/2021   • BRCA negative - [facility] 12/17/2021   • Family history of ovarian cancer - mother in 62s 12/17/2021   • Family history of colon cancer - brother in 45s 12/17/2021   • History of right ovarian cyst - stable 12/17/2021   • Diabetes mellitus ([address] )    • Hyperlipidemia    • Hypertension      She  has a past surgical history that includes Colonoscopy (06/2021); Hysterectomy; and Mammo (historical) (Bilateral, 12/13/2021)  Her family history includes Colon cancer (age of onset: 39) in her brother; Diabetes in her maternal grandfather, maternal grandmother, and mother; Heart disease in her sister; Hypertension in her maternal grandfather and maternal grandmother; Kidney failure in her father; No Known Problems in her brother, brother, brother, maternal aunt, maternal aunt, maternal aunt, maternal aunt, maternal aunt, maternal aunt, paternal aunt, paternal aunt, paternal grandfather, paternal grandmother, sister, sister, sister, sister, son, and son; Ovarian cancer (age of onset: 61) in her mother  She  reports that she has never smoked   She has never used smokeless tobacco  She reports current alcohol use  She reports that she does not use drugs  Current Outpatient Medications   Medication Sig Dispense Refill   • clobetasol (TEMOVATE) 0 05 % external solution APPLY SOLUTION TO SCALP TOPICALLY EVERY OTHER DAY     • cyanocobalamin 1,000 mcg/mL INJECT 1000 MCG INTRAMUSCULARLY MONTHLY     • losartan (COZAAR) 25 mg tablet every 24 hours     • metFORMIN (GLUCOPHAGE-XR) 750 mg 24 hr tablet every 24 hours     • omega-3-acid ethyl esters (LOVAZA) 1 g capsule Every 12 hours     • Rosuvastatin Calcium 10 MG CPSP every 24 hours     • Semaglutide,0 25 or 0 5MG/DOS, (Ozempic, 0 25 or 0 5 MG/DOSE,) 2 MG/1 5ML SOPN        No current facility-administered medications for this visit  She has No Known Allergies       Review of Systems No breast, bladder, bowel changes   No new persistent pain, bloating, early satiety or pelvic pressure      Objective:      /68 (BP Location: Left arm, Patient Position: Sitting, Cuff Size: Standard)   Ht 5' 0 5" (1 537 m)   Wt 56 2 kg (124 lb)   BMI 23 82 kg/m²         Physical Exam   General appearance: no distress, pleasant  Neck: thyroid with questionable right nodule, no thyromegaly, no palpable adenopathy  Lymph nodes: no palpable adenopathy  Breasts: no masses, nodes or skin changes  Abdomen: soft, non tender, no palpable masses  Pelvic exam: normal atrophic external genitalia, stable right bartholin cyst, nontender, urethral meatus normal, vagina atrophic without lesions, cuff intact, no adnexal masses, non tender  Rectal exam: normal sphincter tone, no masses, RV confirms above

## 2023-01-13 NOTE — ASSESSMENT & PLAN NOTE
Discussed with the patient the lack of screening tests available for ovarian cancer  No data to support pelvic ultrasound or ca-125 "screening" as a method to detect disease earlier or change prognosis after diagnosis  Recommend awareness of symptoms and to contact for new persistent abdominal or pelvic pain, new progressive bloating, problems eating (early satiety, getting full too fast), or constant urinary pressure  Would like to continue with annual ca125   Orders given

## 2023-01-13 NOTE — ASSESSMENT & PLAN NOTE
All well, no complaints  Normal breast and pelvic exams  Questionable right thyroid nodule on exam, u/s ordered  S/p hysterectomy  Mammo order given, last 10/27/22  Colonoscopy 2021, due 2026  Dexa 10/27/22, WNL   Repeat 5 years

## 2023-01-13 NOTE — PATIENT INSTRUCTIONS
Return to office in one year unless having any problems such as breast changes, bleeding, new persistent pain, new progressive bloating, new problems eating (getting full to quickly) or new constant urinary pressure that does not resolve in one week  For the incontinence, I recommend bladder training with timed voids every 2-3 hours while awake, avoidance of irritants specifically tea/coffee/soda products and Kegel exercises  You can teach yourself the exercises by stopping your urine flow on the toilet  Then hold for 10 seconds, 10 times in a row  If you do that set of exercises 2-3 times per day, you will gain strength, then when you sneeze you can squeeze up first and getter better control  Call in six months to schedule your annual visit

## 2023-01-26 ENCOUNTER — HOSPITAL ENCOUNTER (OUTPATIENT)
Dept: ULTRASOUND IMAGING | Facility: HOSPITAL | Age: 68
End: 2023-01-26
Attending: OBSTETRICS & GYNECOLOGY

## 2023-01-26 DIAGNOSIS — E04.1 THYROID NODULE: ICD-10-CM

## 2023-02-01 LAB — CANCER AG125 SERPL-ACNC: 12.2 U/ML (ref 0–38.1)

## 2023-02-02 ENCOUNTER — TELEPHONE (OUTPATIENT)
Dept: ENDOCRINOLOGY | Facility: CLINIC | Age: 68
End: 2023-02-02

## 2023-02-02 ENCOUNTER — TELEPHONE (OUTPATIENT)
Dept: OBGYN CLINIC | Facility: CLINIC | Age: 68
End: 2023-02-02

## 2023-02-02 DIAGNOSIS — E04.1 RIGHT THYROID NODULE: Primary | ICD-10-CM

## 2023-02-02 NOTE — TELEPHONE ENCOUNTER
Spoke with pt aware of 3 cm thyroid nodule that warrants biopsy  Endocrinology provider information given  Referral placed

## 2023-02-07 ENCOUNTER — TELEPHONE (OUTPATIENT)
Dept: OBGYN CLINIC | Facility: CLINIC | Age: 68
End: 2023-02-07

## 2023-03-08 ENCOUNTER — CONSULT (OUTPATIENT)
Dept: ENDOCRINOLOGY | Facility: CLINIC | Age: 68
End: 2023-03-08

## 2023-03-08 VITALS
SYSTOLIC BLOOD PRESSURE: 118 MMHG | DIASTOLIC BLOOD PRESSURE: 70 MMHG | WEIGHT: 125 LBS | HEIGHT: 61 IN | BODY MASS INDEX: 23.6 KG/M2

## 2023-03-08 DIAGNOSIS — E04.1 RIGHT THYROID NODULE: ICD-10-CM

## 2023-03-08 DIAGNOSIS — E11.69 TYPE 2 DIABETES MELLITUS WITH OTHER SPECIFIED COMPLICATION, WITHOUT LONG-TERM CURRENT USE OF INSULIN (HCC): ICD-10-CM

## 2023-03-08 NOTE — PROGRESS NOTES
Mike Friedman 74 y o  female MRN: [id number]    Encounter: [id number]      New patient progress note    Impression:  Multinodular goiter  History of Dm type 2 non on insulin    Assessment: This is a 74y o -year-old female with past medical history of Diabetes, presents to the clinic for a consult on multinodular goiter    Plan:  Thyroid ultrasound demonstrated  A 3 cm nodule in the isthmus that meets criteria for biopsy  Patient denies any compressive symptoms such as choking, shortness of breath or dysphagia  We will order TSH level  We will place the order for thyroid biopsy with [facility]  We will follow results    CC: Thyroid nodules      HPI:  74years old female with past medical history of Diabetes type 2 presents to the clinic for a consult on a thyroid nodules  Patient denies any history of thyroid disease in the past and denies any symptoms associated with thyroid nodules such as shortness of breath, neck tenderness dysphagia or choking  Patient does not take any over-the-counter supplements and denies any history of radiation therapy to her head neck or chest   Patient denies family history of thyroid cancer  Patient denies any symptoms associated with hyperthyroidism  Patient reports that she went to be with her OB/GYN who palpated a thyroid nodule and ordered a thyroid ultrasound  Review of Systems   Constitutional: Negative for activity change and appetite change  HENT: Negative for congestion and facial swelling  Eyes: Negative for photophobia and discharge  Respiratory: Negative for apnea, shortness of breath and wheezing  Cardiovascular: Negative for chest pain and palpitations  Gastrointestinal: Negative for abdominal distention and abdominal pain  Endocrine: Negative for cold intolerance, heat intolerance and polydipsia  Musculoskeletal: Negative for arthralgias and back pain  Skin: Negative for color change and wound     Neurological: Negative for tremors, weakness and headaches  Psychiatric/Behavioral: Negative for agitation, behavioral problems and confusion         Historical Information   Past Medical History:   Diagnosis Date   • Diabetes mellitus ([facility] [address] )     Type II   • Hyperlipidemia    • Hypertension      Past Surgical History:   Procedure Laterality Date   • COLONOSCOPY  06/2021    Due 2026   • HYSTERECTOMY      [facility]ICK[facility]   • MAMMO (HISTORICAL) Bilateral 12/13/2021    [address]     Social History   Social History     Substance and Sexual Activity   Alcohol Use Yes     Social History     Substance and Sexual Activity   Drug Use Never     Social History     Tobacco Use   Smoking Status Never   Smokeless Tobacco Never     Family History:   Family History   Problem Relation Age of Onset   • Diabetes Mother    • Ovarian cancer Mother 61   • Kidney failure Father    • Heart disease Sister    • No Known Problems Sister    • No Known Problems Sister    • No Known Problems Sister    • No Known Problems Sister    • Colon cancer Brother 39   • No Known Problems Brother    • No Known Problems Brother    • No Known Problems Brother    • No Known Problems Son    • No Known Problems Son    • Diabetes Maternal Grandmother    • Hypertension Maternal Grandmother    • Diabetes Maternal Grandfather    • Hypertension Maternal Grandfather    • No Known Problems Paternal Grandmother    • No Known Problems Paternal Grandfather    • No Known Problems Maternal Aunt    • No Known Problems Maternal Aunt    • No Known Problems Maternal Aunt    • No Known Problems Maternal Aunt    • No Known Problems Maternal Aunt    • No Known Problems Maternal Aunt    • No Known Problems Paternal Aunt    • No Known Problems Paternal Aunt    • Breast cancer Neg Hx    • Uterine cancer Neg Hx        Meds/Allergies   Current Outpatient Medications   Medication Sig Dispense Refill   • clobetasol (TEMOVATE) 0 05 % external solution APPLY SOLUTION TO SCALP TOPICALLY EVERY OTHER DAY     • cyanocobalamin 1,000 mcg/mL INJECT 1000 MCG INTRAMUSCULARLY MONTHLY     • losartan (COZAAR) 25 mg tablet every 24 hours     • metFORMIN (GLUCOPHAGE-XR) 750 mg 24 hr tablet every 24 hours     • omega-3-acid ethyl esters (LOVAZA) 1 g capsule Every 12 hours     • Rosuvastatin Calcium 10 MG CPSP every 24 hours     • Semaglutide,0 25 or 0 5MG/DOS, (Ozempic, 0 25 or 0 5 MG/DOSE,) 2 MG/1 5ML SOPN        No current facility-administered medications for this visit  No Known Allergies    Objective   Vitals: Blood pressure 118/70, height 5' 0 5" (1 537 m), weight 56 7 kg (125 lb), not currently breastfeeding  Physical Exam  Constitutional:       Appearance: Normal appearance  HENT:      Nose: No congestion or rhinorrhea  Mouth/Throat:      Pharynx: No oropharyngeal exudate or posterior oropharyngeal erythema  Eyes:      Conjunctiva/sclera: Conjunctivae normal       Pupils: Pupils are equal, round, and reactive to light  Neck:      Comments: Palpable thyroid nodule in isthmus  Cardiovascular:      Rate and Rhythm: Normal rate and regular rhythm  Pulses: Normal pulses  Heart sounds: No murmur heard  No friction rub  Pulmonary:      Effort: No respiratory distress  Breath sounds: No stridor  No wheezing  Abdominal:      General: There is no distension  Palpations: Abdomen is soft  Tenderness: There is no abdominal tenderness  Musculoskeletal:         General: No swelling  Cervical back: No rigidity or tenderness  Skin:     Coloration: Skin is not jaundiced  Findings: No bruising  Neurological:      General: No focal deficit present  Mental Status: She is alert and oriented to person, place, and time  Motor: No weakness  Psychiatric:         Mood and Affect: Mood normal          Thought Content:  Thought content normal          Judgment: Judgment normal              Lab Results:        Imaging Studies:   Results for orders placed during the hospital encounter of 01/26/23    US thyroid    Impression  The following meet current ACR criteria for recommending ultrasound guided biopsy:      The 3 0 cm isthmic nodule  (Image number 34) (CRITERIA: TR 3, Mildly suspicious  FNA if >2 5 cm  Reference: ACR Thyroid Imaging, Reporting and Data System (TI-RADS): White Paper of the [employer]  J AM Barbara Radiol 5460;24:235-713  (additional recommendations based on American Thyroid Association 2015 guidelines )      The study was marked in EPIC for significant notification  Workstation performed: [id number]        Portions of the record may have been created with voice recognition software  Occasional wrong word or "sound a like" substitutions may have occurred due to the inherent limitations of voice recognition software  Read the chart carefully and recognize, using context, where substitutions have occurred

## 2023-04-04 ENCOUNTER — HOSPITAL ENCOUNTER (OUTPATIENT)
Dept: ULTRASOUND IMAGING | Facility: HOSPITAL | Age: 68
Discharge: HOME | End: 2023-04-04

## 2023-04-04 ENCOUNTER — APPOINTMENT (OUTPATIENT)
Dept: LAB | Facility: HOSPITAL | Age: 68
End: 2023-04-04

## 2023-04-04 DIAGNOSIS — E04.1 RIGHT THYROID NODULE: ICD-10-CM

## 2023-04-04 DIAGNOSIS — E04.1 NONTOXIC UNINODULAR GOITER: ICD-10-CM

## 2023-04-04 LAB — TSH SERPL DL<=0.05 MIU/L-ACNC: 3.03 UIU/ML (ref 0.45–4.5)

## 2023-04-04 RX ORDER — LIDOCAINE HYDROCHLORIDE 10 MG/ML
5 INJECTION, SOLUTION EPIDURAL; INFILTRATION; INTRACAUDAL; PERINEURAL ONCE
Status: COMPLETED | OUTPATIENT
Start: 2023-04-04 | End: 2023-04-04

## 2023-04-04 RX ADMIN — LIDOCAINE HYDROCHLORIDE 5 ML: 10 INJECTION, SOLUTION EPIDURAL; INFILTRATION; INTRACAUDAL; PERINEURAL at 13:10

## 2023-05-01 PROBLEM — E04.1 THYROID NODULE: Status: ACTIVE | Noted: 2023-05-01

## 2023-05-02 ENCOUNTER — CONSULT (OUTPATIENT)
Dept: SURGICAL ONCOLOGY | Facility: CLINIC | Age: 68
End: 2023-05-02

## 2023-05-02 VITALS
DIASTOLIC BLOOD PRESSURE: 70 MMHG | BODY MASS INDEX: 23.41 KG/M2 | TEMPERATURE: 97.5 F | HEIGHT: 61 IN | SYSTOLIC BLOOD PRESSURE: 120 MMHG | RESPIRATION RATE: 16 BRPM | OXYGEN SATURATION: 100 % | HEART RATE: 49 BPM | WEIGHT: 124 LBS

## 2023-05-02 DIAGNOSIS — E04.1 THYROID NODULE: Primary | ICD-10-CM

## 2023-05-02 RX ORDER — LEVOTHYROXINE SODIUM 0.1 MG/1
100 TABLET ORAL DAILY
Qty: 30 TABLET | Refills: 0 | Status: SHIPPED | OUTPATIENT
Start: 2023-05-02

## 2023-05-02 RX ORDER — TRAMADOL HYDROCHLORIDE 50 MG/1
50 TABLET ORAL EVERY 6 HOURS PRN
Qty: 10 TABLET | Refills: 0 | Status: SHIPPED | OUTPATIENT
Start: 2023-05-02

## 2023-05-02 RX ORDER — CEFAZOLIN SODIUM 1 G/50ML
1000 SOLUTION INTRAVENOUS ONCE
OUTPATIENT
Start: 2023-05-02 | End: 2023-05-02

## 2023-05-02 NOTE — H&P (VIEW-ONLY)
Surgical Oncology Consultation    [address] [facility] ONCOLOGY [address] 1215 [facility]  [phone]    Patient:  Jeramie Esparza  1955  [id number]    Primary Care provider:  Nara Griffith MD  [address] [facility] [address]    Referring provider:  Alejandra Torres MD  [address] [facility],  [address]    Diagnoses and all orders for this visit:    Thyroid nodule  -     Ambulatory referral to Surgical Oncology        Chief Complaint   Patient presents with   • Consult       No follow-ups on file  Oncology History    No history exists  History of Present Illness  :   78 yo female with new diagnosis of 2 5 cm isthmus nodule of thyroid  Detected on exam by ob/gyn; pt asymptomatic with normal TSH, no sx of hyper or hypothyroidism, no pain, trouble swallowing, pain with swallowing, SOB  US obtained revealing 2 5 cm nodule of isthmus and smaller RT nodule  Isthmus nodule biopsied and resulted in [place] IV with 50% risk on afirma  Pt doing well today  No cardiopulm hx, no hx procedures or XRT to the neck  Very active and working part time  Review of Systems  Complete ROS Surg Onc:   Constitutional: The patient denies new or recent history of general fatigue, no recent weight loss, no change in appetite  Eyes: No complaints of visual problems, no scleral icterus  ENT: No complaints of ear pain, no hoarseness, no difficulty swallowing,  no tinnitus and no new masses in head, oral cavity, or neck  Cardiovascular: No complaints of chest pain, no palpitations, no ankle edema  Respiratory: No complaints of shortness of breath, no cough  Gastrointestinal: No complaints of jaundice, no bloody stools, no pale stools  Genitourinary: No complaints of dysuria, no hematuria, no nocturia, no frequent urination, no urethral discharge     Musculoskeletal: No complaints of weakness, paralysis, joint stiffness or arthralgias  Integumentary: No complaints of rash, no new lesions  Neurological: No complaints of convulsions, no seizures, no dizziness  Hematologic/Lymphatic: No complaints of easy bruising  Endocrine:  No hot or cold intolerance  No polydipsia, polyphagia, or polyuria  Allergy/immunology:  No environmental allergies  No food allergies  Not immunocompromised        Patient Active Problem List   Diagnosis   • Diabetes mellitus ([facility] [address] )   • Hyperlipidemia   • Hypertension   • History of hysterectomy   • BRCA negative - Negative Myriad My[place] 1/2018   • Family history of ovarian cancer - mother in 62s   • Family history of colon cancer - brother in 45s   • History of right ovarian cyst - stable   • Encounter for gynecological examination (general) (routine) without abnormal findings   • Thyroid nodule     Past Medical History:   Diagnosis Date   • Diabetes mellitus ([facility] [address] )     Type II   • Hyperlipidemia    • Hypertension      Past Surgical History:   Procedure Laterality Date   • COLONOSCOPY  06/2021    Due 2026   • HYSTERECTOMY      RICKY   • MAMMO (HISTORICAL) Bilateral 12/13/2021    [address]   • US GUIDED THYROID BIOPSY  4/4/2023     Family History   Problem Relation Age of Onset   • Diabetes Mother    • Ovarian cancer Mother 61   • Kidney failure Father    • Heart disease Sister    • No Known Problems Sister    • No Known Problems Sister    • No Known Problems Sister    • No Known Problems Sister    • Colon cancer Brother 39   • No Known Problems Brother    • No Known Problems Brother    • No Known Problems Brother    • No Known Problems Son    • No Known Problems Son    • Diabetes Maternal Grandmother    • Hypertension Maternal Grandmother    • Diabetes Maternal Grandfather    • Hypertension Maternal Grandfather    • No Known Problems Paternal Grandmother    • No Known Problems Paternal Grandfather    • No Known Problems Maternal Aunt    • No Known Problems Maternal Aunt    • No Known Problems Maternal Aunt    • No Known Problems Maternal Aunt    • No Known Problems Maternal Aunt    • No Known Problems Maternal Aunt    • No Known Problems Paternal Aunt    • No Known Problems Paternal Aunt    • Breast cancer Neg Hx    • Uterine cancer Neg Hx      Social History     Socioeconomic History   • Marital status: /Civil Union     Spouse name: Not on file   • Number of children: Not on file   • Years of education: Not on file   • Highest education level: Not on file   Occupational History   • Not on file   Tobacco Use   • Smoking status: Never   • Smokeless tobacco: Never   Vaping Use   • Vaping Use: Never used   Substance and Sexual Activity   • Alcohol use:  Yes   • Drug use: Never   • Sexual activity: Yes     Partners: Male     Birth control/protection: Surgical     Comment: no new partner in past 43 years   Other Topics Concern   • Not on file   Social History Narrative    Exercise: 2-3 times a week    Domestic violence: No     Social Determinants of Health     Financial Resource Strain: Not on file   Food Insecurity: Not on file   Transportation Needs: Not on file   Physical Activity: Not on file   Stress: Not on file   Social Connections: Not on file   Intimate Partner Violence: Not on file   Housing Stability: Not on file       Current Outpatient Medications:   •  cyanocobalamin 1,000 mcg/mL, INJECT 1000 MCG INTRAMUSCULARLY MONTHLY, Disp: , Rfl:   •  losartan (COZAAR) 25 mg tablet, every 24 hours, Disp: , Rfl:   •  metFORMIN (GLUCOPHAGE-XR) 750 mg 24 hr tablet, every 24 hours, Disp: , Rfl:   •  omega-3-acid ethyl esters (LOVAZA) 1 g capsule, Every 12 hours, Disp: , Rfl:   •  Rosuvastatin Calcium 10 MG CPSP, every 24 hours, Disp: , Rfl:   •  Semaglutide,0 25 or 0 5MG/DOS, (Ozempic, 0 25 or 0 5 MG/DOSE,) 2 MG/1 5ML SOPN, , Disp: , Rfl:   •  clobetasol (TEMOVATE) 0 05 % external solution, APPLY SOLUTION TO SCALP TOPICALLY EVERY OTHER DAY (Patient not taking: Reported on 5/2/2023), Disp: , Rfl:   No Known Allergies    Vitals:    05/02/23 1301   BP: 120/70   Pulse: (!) 49   Resp: 16   Temp: 97 5 °F (36 4 °C)   SpO2: 100%       Physical Exam   General: Appears well, appears stated age  Skin: Warm, anicteric  HEENT: Normocephalic, atraumatic; sclera aniceteric, mucous membranes moist; cervical nodes without adenopathy  Anterior fullness appreciated  Cardiopulmonary: RRR, Easy WOB, no BLE edema  Abd: Flat and soft, nontender, no masses appreciated, no hepatosplenomegaly  MSK: Symmetric, no cyanosis, no overt weakness  Lymphatic: No cervical, axillary or inguinal lymphadenopathy  Neuro: Affect appropriate, no gross motor abnormalities      Pathology:  Final Diagnosis   A  & B  Thyroid, Isthmus: (Thin-Prep and smears)  Follicular neoplasm/Suspicious for follicular neoplasm ([place] Category IV) - See note  Cellular specimen  Atypical follicular cells with crowding, overlapping, occasional microfollicular patterns, increased nuclear size, prominent nucleoli, and occasional nuclear grooves  Mixed inflammatory cells and colloid      Satisfactory for evaluation  Labs: Reviewed in [place] [address] guided thyroid biopsy with [facility]    Result Date: 4/4/2023  Narrative: ULTRASOUND-GUIDED THYROID BIOPSY HISTORY: 74year-old female with a history of thyroid nodules  COMPARISON: Thyroid ultrasound on 1/26/2023  FINDINGS: Prior ultrasound images were reviewed  On prior thyroid ultrasound from 1/26/2023, there was a left isthmus thyroid nodule measuring 2 5 x 1 3 x 3 0 cm (TI-RADS 3) that met criteria for FNA   The patient presented today with a prescription to undergo FNA of the left isthmus thyroid nodule with molecular testing  On today's limited thyroid ultrasound, the left isthmus thyroid nodule measured 2 4 x 1 3 x 3 1 cm  The procedure was explained to the patient, including risks of hemorrhage, infection and local injury  Informed consent was freely obtained   The patient verbalized expressed understanding of the "above risks and wished to proceed with the procedure  Final standard \"time-out\" procedure performed  PROCEDURE: The neck was prepped and draped in normal sterile fashion  Under real-time ultrasound guidance and local anesthesia 5 passes with a 25 gauge needle were made through the left isthmus thyroid nodule  Cytopathology was present and deemed the specimens adequate for evaluation  Sharona Mckinleydon 3 specimens were sent to cytology and 2 were reserved for potential molecular testing  The patient tolerated the procedure well  Postprocedure instructions were provided for the patient  I asked the patient to call us with any questions, concerns, or acute problems  The patient expressed understanding of the above  Impression: Status post successful ultrasound-guided thyroid biopsy with molecular testing of the left isthmus thyroid nodule  I reviewed the above findings and procedure with Dr Mani Syed  PERFORMED, DICTATED AND SIGNED BY: Katie Kan PA-C Workstation performed: [id number]       I independently reviewed and interpreted the above laboratory and imaging data, incl US, path, afirma      Discussion/Summary:   We discussed today that this nodule has a relatively high risk of representing the most common type of thyroid cancer, a well-differentiated papillary or follicular cancer  I discussed that the typical treatment for this type of cancer would be a partial vs total thyroidectomy  We discussed that based on her ultrasound and biopsy results, I recommend a total thyroidectomy - based on appearance on US I am concerned for extracapsular extension  For this reason, I would also recommend laryngoscopy before induction of anesthesia to document vocal cord movement  We also discussed that the final pathology will determine if additional treatment, with surgery or radioactive iodine, might be necessary    We discussed the natural history of well differentiated thyroid cancer, as well as the proposed procedure and " expected postop course  We discussed the risks of total thyroid to include wound healing complications, infection, bleeding, damage to nearby structures, voice hoarseness, voice pitch change, temporary or permanent calcium regulation disruption, surgical airway emergency  We also discussed the risks of surgery with general anesthesia to include MI, PE, stroke, DVT, respiratory failure, PE, death, other medical complication  The patient is at average risk for complications related to surgery  The patient expresses understanding and would like to proceed

## 2023-05-02 NOTE — PROGRESS NOTES
Surgical Oncology Consultation    [address] [facility] ONCOLOGY [address] 20 [facility]  [phone]    Patient:  Myrna Guzman  1955  [id number]    Primary Care provider:  Tata Ye MD  [address] [facility] [address]    Referring provider:  Enrique Pallas, MD  900 [facility],  [address]    Diagnoses and all orders for this visit:    Thyroid nodule  -     Ambulatory referral to Surgical Oncology        Chief Complaint   Patient presents with    Consult       No follow-ups on file  Oncology History    No history exists  History of Present Illness  :   78 yo female with new diagnosis of 2 5 cm isthmus nodule of thyroid  Detected on exam by ob/gyn; pt asymptomatic with normal TSH, no sx of hyper or hypothyroidism, no pain, trouble swallowing, pain with swallowing, SOB  US obtained revealing 2 5 cm nodule of isthmus and smaller RT nodule  Isthmus nodule biopsied and resulted in [place] IV with 50% risk on afirma  Pt doing well today  No cardiopulm hx, no hx procedures or XRT to the neck  Very active and working part time  Review of Systems  Complete ROS Surg Onc:   Constitutional: The patient denies new or recent history of general fatigue, no recent weight loss, no change in appetite  Eyes: No complaints of visual problems, no scleral icterus  ENT: No complaints of ear pain, no hoarseness, no difficulty swallowing,  no tinnitus and no new masses in head, oral cavity, or neck  Cardiovascular: No complaints of chest pain, no palpitations, no ankle edema  Respiratory: No complaints of shortness of breath, no cough  Gastrointestinal: No complaints of jaundice, no bloody stools, no pale stools  Genitourinary: No complaints of dysuria, no hematuria, no nocturia, no frequent urination, no urethral discharge     Musculoskeletal: No complaints of weakness, paralysis, joint stiffness or arthralgias  Integumentary: No complaints of rash, no new lesions  Neurological: No complaints of convulsions, no seizures, no dizziness  Hematologic/Lymphatic: No complaints of easy bruising  Endocrine:  No hot or cold intolerance  No polydipsia, polyphagia, or polyuria  Allergy/immunology:  No environmental allergies  No food allergies  Not immunocompromised        Patient Active Problem List   Diagnosis    Diabetes mellitus ([facility] [address] )    Hyperlipidemia    Hypertension    History of hysterectomy    BRCA negative - Negative Myriad [place] 1/2018    Family history of ovarian cancer - mother in 62s    Family history of colon cancer - brother in 45s    History of right ovarian cyst - stable    Encounter for gynecological examination (general) (routine) without abnormal findings    Thyroid nodule     Past Medical History:   Diagnosis Date    Diabetes mellitus ([facility] [address] )     Type II    Hyperlipidemia     Hypertension      Past Surgical History:   Procedure Laterality Date    COLONOSCOPY  06/2021    Due 2026    HYSTERECTOMY      RICKY    MAMMO (HISTORICAL) Bilateral 12/13/2021    [address]    US GUIDED THYROID BIOPSY  4/4/2023     Family History   Problem Relation Age of Onset    Diabetes Mother     Ovarian cancer Mother 61    Kidney failure Father     Heart disease Sister     No Known Problems Sister     No Known Problems Sister     No Known Problems Sister     No Known Problems Sister     Colon cancer Brother 39    No Known Problems Brother     No Known Problems Brother     No Known Problems Brother     No Known Problems Son     No Known Problems Son     Diabetes Maternal Grandmother     Hypertension Maternal Grandmother     Diabetes Maternal Grandfather     Hypertension Maternal Grandfather     No Known Problems Paternal Grandmother     No Known Problems Paternal Grandfather     No Known Problems Maternal Aunt     No Known Problems Maternal Aunt     No Known Problems Maternal Aunt     No Known Problems Maternal Aunt     No Known Problems Maternal Aunt     No Known Problems Maternal Aunt     No Known Problems Paternal Aunt     No Known Problems Paternal Aunt     Breast cancer Neg Hx     Uterine cancer Neg Hx      Social History     Socioeconomic History    Marital status: /Civil Union     Spouse name: Not on file    Number of children: Not on file    Years of education: Not on file    Highest education level: Not on file   Occupational History    Not on file   Tobacco Use    Smoking status: Never    Smokeless tobacco: Never   Vaping Use    Vaping Use: Never used   Substance and Sexual Activity    Alcohol use:  Yes    Drug use: Never    Sexual activity: Yes     Partners: Male     Birth control/protection: Surgical     Comment: no new partner in past 43 years   Other Topics Concern    Not on file   Social History Narrative    Exercise: 2-3 times a week    Domestic violence: No     Social Determinants of Health     Financial Resource Strain: Not on file   Food Insecurity: Not on file   Transportation Needs: Not on file   Physical Activity: Not on file   Stress: Not on file   Social Connections: Not on file   Intimate Partner Violence: Not on file   Housing Stability: Not on file       Current Outpatient Medications:     cyanocobalamin 1,000 mcg/mL, INJECT 1000 MCG INTRAMUSCULARLY MONTHLY, Disp: , Rfl:     losartan (COZAAR) 25 mg tablet, every 24 hours, Disp: , Rfl:     metFORMIN (GLUCOPHAGE-XR) 750 mg 24 hr tablet, every 24 hours, Disp: , Rfl:     omega-3-acid ethyl esters (LOVAZA) 1 g capsule, Every 12 hours, Disp: , Rfl:     Rosuvastatin Calcium 10 MG CPSP, every 24 hours, Disp: , Rfl:     Semaglutide,0 25 or 0 5MG/DOS, (Ozempic, 0 25 or 0 5 MG/DOSE,) 2 MG/1 5ML SOPN, , Disp: , Rfl:     clobetasol (TEMOVATE) 0 05 % external solution, APPLY SOLUTION TO SCALP TOPICALLY EVERY OTHER DAY (Patient not taking: Reported on 5/2/2023), Disp: , Rfl:   No Known Allergies    Vitals:    05/02/23 1301   BP: 120/70   Pulse: (!) 49   Resp: 16   Temp: 97 5 °F (36 4 °C)   SpO2: 100%       Physical Exam   General: Appears well, appears stated age  Skin: Warm, anicteric  HEENT: Normocephalic, atraumatic; sclera aniceteric, mucous membranes moist; cervical nodes without adenopathy  Anterior fullness appreciated  Cardiopulmonary: RRR, Easy WOB, no BLE edema  Abd: Flat and soft, nontender, no masses appreciated, no hepatosplenomegaly  MSK: Symmetric, no cyanosis, no overt weakness  Lymphatic: No cervical, axillary or inguinal lymphadenopathy  Neuro: Affect appropriate, no gross motor abnormalities      Pathology:  Final Diagnosis   A  & B  Thyroid, Isthmus: (Thin-Prep and smears)  Follicular neoplasm/Suspicious for follicular neoplasm ([employer] Category IV) - See note  Cellular specimen  Atypical follicular cells with crowding, overlapping, occasional microfollicular patterns, increased nuclear size, prominent nucleoli, and occasional nuclear grooves  Mixed inflammatory cells and colloid      Satisfactory for evaluation  Labs: Reviewed in [place] [address] guided thyroid biopsy with [facility]    Result Date: 4/4/2023  Narrative: ULTRASOUND-GUIDED THYROID BIOPSY HISTORY: 74year-old female with a history of thyroid nodules  COMPARISON: Thyroid ultrasound on 1/26/2023  FINDINGS: Prior ultrasound images were reviewed  On prior thyroid ultrasound from 1/26/2023, there was a left isthmus thyroid nodule measuring 2 5 x 1 3 x 3 0 cm (TI-RADS 3) that met criteria for FNA   The patient presented today with a prescription to undergo FNA of the left isthmus thyroid nodule with molecular testing  On today's limited thyroid ultrasound, the left isthmus thyroid nodule measured 2 4 x 1 3 x 3 1 cm  The procedure was explained to the patient, including risks of hemorrhage, infection and local injury  Informed consent was freely obtained   The patient verbalized expressed understanding of the "above risks and wished to proceed with the procedure  Final standard \"time-out\" procedure performed  PROCEDURE: The neck was prepped and draped in normal sterile fashion  Under real-time ultrasound guidance and local anesthesia 5 passes with a 25 gauge needle were made through the left isthmus thyroid nodule  Cytopathology was present and deemed the specimens adequate for evaluation  Josee Mendoza 3 specimens were sent to cytology and 2 were reserved for potential molecular testing  The patient tolerated the procedure well  Postprocedure instructions were provided for the patient  I asked the patient to call us with any questions, concerns, or acute problems  The patient expressed understanding of the above  Impression: Status post successful ultrasound-guided thyroid biopsy with molecular testing of the left isthmus thyroid nodule  I reviewed the above findings and procedure with Dr Dorothy Rogers  PERFORMED, DICTATED AND SIGNED BY: Kayla Teague PA-C Workstation performed: [id number]       I independently reviewed and interpreted the above laboratory and imaging data, incl US, path, afirma      Discussion/Summary:   We discussed today that this nodule has a relatively high risk of representing the most common type of thyroid cancer, a well-differentiated papillary or follicular cancer  I discussed that the typical treatment for this type of cancer would be a partial vs total thyroidectomy  We discussed that based on her ultrasound and biopsy results, I recommend a total thyroidectomy - based on appearance on US I am concerned for extracapsular extension  For this reason, I would also recommend laryngoscopy before induction of anesthesia to document vocal cord movement  We also discussed that the final pathology will determine if additional treatment, with surgery or radioactive iodine, might be necessary    We discussed the natural history of well differentiated thyroid cancer, as well as the proposed procedure and " expected postop course  We discussed the risks of total thyroid to include wound healing complications, infection, bleeding, damage to nearby structures, voice hoarseness, voice pitch change, temporary or permanent calcium regulation disruption, surgical airway emergency  We also discussed the risks of surgery with general anesthesia to include MI, PE, stroke, DVT, respiratory failure, PE, death, other medical complication  The patient is at average risk for complications related to surgery  The patient expresses understanding and would like to proceed

## 2023-05-03 ENCOUNTER — TELEPHONE (OUTPATIENT)
Dept: SURGICAL ONCOLOGY | Facility: CLINIC | Age: 68
End: 2023-05-03

## 2023-05-03 NOTE — TELEPHONE ENCOUNTER
I called and left a message for patient in regards to her concerns about surgery not being approved by her insurance  I let patient know that per our finance group,  due to her surgery being an outpatient procedure, there is no authorization needed, therefor she is good to go for surgery on 5/23/23  Patient can call me back with any questions or concerns

## 2023-05-06 ENCOUNTER — APPOINTMENT (OUTPATIENT)
Dept: LAB | Facility: HOSPITAL | Age: 68
End: 2023-05-06
Attending: STUDENT IN AN ORGANIZED HEALTH CARE EDUCATION/TRAINING PROGRAM

## 2023-05-06 ENCOUNTER — HOSPITAL ENCOUNTER (OUTPATIENT)
Dept: RADIOLOGY | Facility: HOSPITAL | Age: 68
Discharge: HOME | End: 2023-05-06
Attending: STUDENT IN AN ORGANIZED HEALTH CARE EDUCATION/TRAINING PROGRAM

## 2023-05-06 ENCOUNTER — HOSPITAL ENCOUNTER (OUTPATIENT)
Dept: ULTRASOUND IMAGING | Facility: HOSPITAL | Age: 68
Discharge: HOME | End: 2023-05-06
Attending: STUDENT IN AN ORGANIZED HEALTH CARE EDUCATION/TRAINING PROGRAM

## 2023-05-06 DIAGNOSIS — Z01.818 ENCOUNTER FOR PREADMISSION TESTING: ICD-10-CM

## 2023-05-06 DIAGNOSIS — E04.1 THYROID NODULE: ICD-10-CM

## 2023-05-06 LAB
ALBUMIN SERPL BCP-MCNC: 4.7 G/DL (ref 3.5–5)
ALP SERPL-CCNC: 60 U/L (ref 34–104)
ALT SERPL W P-5'-P-CCNC: 24 U/L (ref 7–52)
ANION GAP SERPL CALCULATED.3IONS-SCNC: 8 MMOL/L (ref 4–13)
AST SERPL W P-5'-P-CCNC: 28 U/L (ref 13–39)
BASOPHILS # BLD AUTO: 0.03 THOUSANDS/ÂΜL (ref 0–0.1)
BASOPHILS NFR BLD AUTO: 1 % (ref 0–1)
BILIRUB SERPL-MCNC: 0.58 MG/DL (ref 0.2–1)
BUN SERPL-MCNC: 20 MG/DL (ref 5–25)
CALCIUM SERPL-MCNC: 9.9 MG/DL (ref 8.4–10.2)
CHLORIDE SERPL-SCNC: 106 MMOL/L (ref 96–108)
CO2 SERPL-SCNC: 25 MMOL/L (ref 21–32)
CREAT SERPL-MCNC: 1.04 MG/DL (ref 0.6–1.3)
EOSINOPHIL # BLD AUTO: 0.14 THOUSAND/ÂΜL (ref 0–0.61)
EOSINOPHIL NFR BLD AUTO: 3 % (ref 0–6)
ERYTHROCYTE [DISTWIDTH] IN BLOOD BY AUTOMATED COUNT: 13.3 % (ref 11.6–15.1)
GFR SERPL CREATININE-BSD FRML MDRD: 56 ML/MIN/1.73SQ M
GLUCOSE P FAST SERPL-MCNC: 87 MG/DL (ref 65–99)
HCT VFR BLD AUTO: 40.1 % (ref 34.8–46.1)
HGB BLD-MCNC: 13.1 G/DL (ref 11.5–15.4)
IMM GRANULOCYTES # BLD AUTO: 0.02 THOUSAND/UL (ref 0–0.2)
IMM GRANULOCYTES NFR BLD AUTO: 0 % (ref 0–2)
LYMPHOCYTES # BLD AUTO: 1.45 THOUSANDS/ÂΜL (ref 0.6–4.47)
LYMPHOCYTES NFR BLD AUTO: 32 % (ref 14–44)
MCH RBC QN AUTO: 31.2 PG (ref 26.8–34.3)
MCHC RBC AUTO-ENTMCNC: 32.7 G/DL (ref 31.4–37.4)
MCV RBC AUTO: 96 FL (ref 82–98)
MONOCYTES # BLD AUTO: 0.7 THOUSAND/ÂΜL (ref 0.17–1.22)
MONOCYTES NFR BLD AUTO: 15 % (ref 4–12)
NEUTROPHILS # BLD AUTO: 2.23 THOUSANDS/ÂΜL (ref 1.85–7.62)
NEUTS SEG NFR BLD AUTO: 49 % (ref 43–75)
NRBC BLD AUTO-RTO: 0 /100 WBCS
PLATELET # BLD AUTO: 163 THOUSANDS/UL (ref 149–390)
PMV BLD AUTO: 10 FL (ref 8.9–12.7)
POTASSIUM SERPL-SCNC: 4.2 MMOL/L (ref 3.5–5.3)
PROT SERPL-MCNC: 7.9 G/DL (ref 6.4–8.4)
RBC # BLD AUTO: 4.2 MILLION/UL (ref 3.81–5.12)
SODIUM SERPL-SCNC: 139 MMOL/L (ref 135–147)
WBC # BLD AUTO: 4.57 THOUSAND/UL (ref 4.31–10.16)

## 2023-05-09 LAB
ATRIAL RATE: 65 BPM
P AXIS: 1 DEGREES
PR INTERVAL: 148 MS
QRS AXIS: -17 DEGREES
QRSD INTERVAL: 72 MS
QT INTERVAL: 410 MS
QTC INTERVAL: 426 MS
T WAVE AXIS: -1 DEGREES
VENTRICULAR RATE: 65 BPM

## 2023-05-11 ENCOUNTER — ANESTHESIA EVENT (OUTPATIENT)
Dept: PERIOP | Facility: HOSPITAL | Age: 68
End: 2023-05-11

## 2023-05-17 NOTE — PRE-PROCEDURE INSTRUCTIONS
Pre-Surgery Instructions:   Medication Instructions   • cyanocobalamin 1,000 mcg/mL Monthly   • losartan (COZAAR) 25 mg tablet Hold day of surgery  • metFORMIN (GLUCOPHAGE-XR) 750 mg 24 hr tablet Hold day of surgery  • omega-3-acid ethyl esters (LOVAZA) 1 g capsule Take day of surgery  • Rosuvastatin Calcium 10 MG CPSP Take day of surgery  • Semaglutide,0 25 or 0 5MG/DOS, (Ozempic, 0 25 or 0 5 MG/DOSE,) 2 MG/1 5ML SOPN Weekly     Spoke with pt via phone  Advised hospital location will call with arrival time night before surgery  NPO after midnight the night before surgery, including chewing gum and hard candy  A small sip of water is allowed to take approved medications the morning of surgery  Instructed to leave contacts/jewelery/valuables at home  Ok to wear dentures, glasses and hearing aides into the hospital - they will be removed for surgery  No smoking or alcohol consumption 24 hours prior to surgery  Avoid all Aspirin products and OTC Vit/Supp 1 week prior to surgery and avoid NSAIDs 3 days prior to surgery per anesthesia instructions  Tylenol ok to take prn  Showering instructions reviewed for night before and morning of surgery using surgical soap or antibacterial soap  Patient verbalized understanding of all of the above, including medication instructions

## 2023-05-23 ENCOUNTER — HOSPITAL ENCOUNTER (OUTPATIENT)
Facility: HOSPITAL | Age: 68
Discharge: HOME | End: 2023-05-24
Attending: STUDENT IN AN ORGANIZED HEALTH CARE EDUCATION/TRAINING PROGRAM | Admitting: STUDENT IN AN ORGANIZED HEALTH CARE EDUCATION/TRAINING PROGRAM

## 2023-05-23 ENCOUNTER — ANESTHESIA (OUTPATIENT)
Dept: PERIOP | Facility: HOSPITAL | Age: 68
End: 2023-05-23

## 2023-05-23 DIAGNOSIS — E04.1 THYROID NODULE: ICD-10-CM

## 2023-05-23 LAB
CALCIUM SERPL-MCNC: 8.9 MG/DL (ref 8.3–10.1)
GLUCOSE SERPL-MCNC: 104 MG/DL (ref 65–140)
GLUCOSE SERPL-MCNC: 96 MG/DL (ref 65–140)
PLATELET # BLD AUTO: 175 THOUSANDS/UL (ref 149–390)
PMV BLD AUTO: 10.1 FL (ref 8.9–12.7)
PTH-INTACT SERPL-MCNC: 32.7 PG/ML (ref 12–88)

## 2023-05-23 RX ORDER — EPHEDRINE SULFATE 50 MG/ML
INJECTION INTRAVENOUS AS NEEDED
Status: DISCONTINUED | OUTPATIENT
Start: 2023-05-23 | End: 2023-05-23

## 2023-05-23 RX ORDER — LIDOCAINE HYDROCHLORIDE 10 MG/ML
INJECTION, SOLUTION EPIDURAL; INFILTRATION; INTRACAUDAL; PERINEURAL AS NEEDED
Status: DISCONTINUED | OUTPATIENT
Start: 2023-05-23 | End: 2023-05-23

## 2023-05-23 RX ORDER — CALCIUM CARBONATE 500 MG/1
1000 TABLET, CHEWABLE ORAL 4 TIMES DAILY
Status: DISCONTINUED | OUTPATIENT
Start: 2023-05-23 | End: 2023-05-24 | Stop reason: HOSPADM

## 2023-05-23 RX ORDER — MAGNESIUM HYDROXIDE 1200 MG/15ML
LIQUID ORAL AS NEEDED
Status: DISCONTINUED | OUTPATIENT
Start: 2023-05-23 | End: 2023-05-23 | Stop reason: HOSPADM

## 2023-05-23 RX ORDER — DEXAMETHASONE SODIUM PHOSPHATE 4 MG/ML
INJECTION, SOLUTION INTRA-ARTICULAR; INTRALESIONAL; INTRAMUSCULAR; INTRAVENOUS; SOFT TISSUE AS NEEDED
Status: DISCONTINUED | OUTPATIENT
Start: 2023-05-23 | End: 2023-05-23

## 2023-05-23 RX ORDER — DEXMEDETOMIDINE HYDROCHLORIDE 100 UG/ML
INJECTION, SOLUTION INTRAVENOUS AS NEEDED
Status: DISCONTINUED | OUTPATIENT
Start: 2023-05-23 | End: 2023-05-23

## 2023-05-23 RX ORDER — ATORVASTATIN CALCIUM 10 MG/1
10 TABLET, FILM COATED ORAL
Status: DISCONTINUED | OUTPATIENT
Start: 2023-05-24 | End: 2023-05-24 | Stop reason: HOSPADM

## 2023-05-23 RX ORDER — OXYCODONE HYDROCHLORIDE 5 MG/1
5 TABLET ORAL EVERY 4 HOURS PRN
Status: DISCONTINUED | OUTPATIENT
Start: 2023-05-23 | End: 2023-05-24 | Stop reason: HOSPADM

## 2023-05-23 RX ORDER — ONDANSETRON 2 MG/ML
INJECTION INTRAMUSCULAR; INTRAVENOUS AS NEEDED
Status: DISCONTINUED | OUTPATIENT
Start: 2023-05-23 | End: 2023-05-23

## 2023-05-23 RX ORDER — ACETAMINOPHEN 325 MG/1
650 TABLET ORAL EVERY 6 HOURS PRN
Status: DISCONTINUED | OUTPATIENT
Start: 2023-05-23 | End: 2023-05-24 | Stop reason: HOSPADM

## 2023-05-23 RX ORDER — HYDROMORPHONE HCL IN WATER/PF 6 MG/30 ML
0.2 PATIENT CONTROLLED ANALGESIA SYRINGE INTRAVENOUS
Status: DISCONTINUED | OUTPATIENT
Start: 2023-05-23 | End: 2023-05-23 | Stop reason: HOSPADM

## 2023-05-23 RX ORDER — SODIUM CHLORIDE, SODIUM LACTATE, POTASSIUM CHLORIDE, CALCIUM CHLORIDE 600; 310; 30; 20 MG/100ML; MG/100ML; MG/100ML; MG/100ML
INJECTION, SOLUTION INTRAVENOUS CONTINUOUS PRN
Status: DISCONTINUED | OUTPATIENT
Start: 2023-05-23 | End: 2023-05-23

## 2023-05-23 RX ORDER — PHENYLEPHRINE HCL IN 0.9% NACL 1 MG/10 ML
SYRINGE (ML) INTRAVENOUS AS NEEDED
Status: DISCONTINUED | OUTPATIENT
Start: 2023-05-23 | End: 2023-05-23

## 2023-05-23 RX ORDER — HYDROMORPHONE HCL/PF 1 MG/ML
0.2 SYRINGE (ML) INJECTION
Status: DISCONTINUED | OUTPATIENT
Start: 2023-05-23 | End: 2023-05-24 | Stop reason: HOSPADM

## 2023-05-23 RX ORDER — OXYMETAZOLINE HYDROCHLORIDE 0.05 G/100ML
SPRAY NASAL AS NEEDED
Status: DISCONTINUED | OUTPATIENT
Start: 2023-05-23 | End: 2023-05-23

## 2023-05-23 RX ORDER — LEVOTHYROXINE SODIUM 0.1 MG/1
100 TABLET ORAL
Status: DISCONTINUED | OUTPATIENT
Start: 2023-05-24 | End: 2023-05-24 | Stop reason: HOSPADM

## 2023-05-23 RX ORDER — ONDANSETRON 2 MG/ML
4 INJECTION INTRAMUSCULAR; INTRAVENOUS ONCE AS NEEDED
Status: DISCONTINUED | OUTPATIENT
Start: 2023-05-23 | End: 2023-05-23 | Stop reason: HOSPADM

## 2023-05-23 RX ORDER — OXYCODONE HYDROCHLORIDE 5 MG/1
2.5 TABLET ORAL EVERY 4 HOURS PRN
Status: DISCONTINUED | OUTPATIENT
Start: 2023-05-23 | End: 2023-05-24 | Stop reason: HOSPADM

## 2023-05-23 RX ORDER — SODIUM CHLORIDE, SODIUM LACTATE, POTASSIUM CHLORIDE, CALCIUM CHLORIDE 600; 310; 30; 20 MG/100ML; MG/100ML; MG/100ML; MG/100ML
20 INJECTION, SOLUTION INTRAVENOUS CONTINUOUS
Status: DISCONTINUED | OUTPATIENT
Start: 2023-05-23 | End: 2023-05-23

## 2023-05-23 RX ORDER — HEPARIN SODIUM 5000 [USP'U]/ML
5000 INJECTION, SOLUTION INTRAVENOUS; SUBCUTANEOUS EVERY 8 HOURS SCHEDULED
Status: DISCONTINUED | OUTPATIENT
Start: 2023-05-23 | End: 2023-05-24 | Stop reason: HOSPADM

## 2023-05-23 RX ORDER — LOSARTAN POTASSIUM 25 MG/1
25 TABLET ORAL EVERY 24 HOURS
Status: DISCONTINUED | OUTPATIENT
Start: 2023-05-23 | End: 2023-05-24 | Stop reason: HOSPADM

## 2023-05-23 RX ORDER — CEFAZOLIN SODIUM 1 G/50ML
1000 SOLUTION INTRAVENOUS ONCE
Status: DISCONTINUED | OUTPATIENT
Start: 2023-05-23 | End: 2023-05-23 | Stop reason: HOSPADM

## 2023-05-23 RX ORDER — CEFAZOLIN SODIUM 1 G/3ML
INJECTION, POWDER, FOR SOLUTION INTRAMUSCULAR; INTRAVENOUS AS NEEDED
Status: DISCONTINUED | OUTPATIENT
Start: 2023-05-23 | End: 2023-05-23

## 2023-05-23 RX ORDER — SUCCINYLCHOLINE/SOD CL,ISO/PF 100 MG/5ML
SYRINGE (ML) INTRAVENOUS AS NEEDED
Status: DISCONTINUED | OUTPATIENT
Start: 2023-05-23 | End: 2023-05-23

## 2023-05-23 RX ORDER — MIDAZOLAM HYDROCHLORIDE 2 MG/2ML
INJECTION, SOLUTION INTRAMUSCULAR; INTRAVENOUS AS NEEDED
Status: DISCONTINUED | OUTPATIENT
Start: 2023-05-23 | End: 2023-05-23

## 2023-05-23 RX ORDER — SODIUM CHLORIDE, SODIUM LACTATE, POTASSIUM CHLORIDE, CALCIUM CHLORIDE 600; 310; 30; 20 MG/100ML; MG/100ML; MG/100ML; MG/100ML
75 INJECTION, SOLUTION INTRAVENOUS CONTINUOUS
Status: DISCONTINUED | OUTPATIENT
Start: 2023-05-23 | End: 2023-05-24

## 2023-05-23 RX ORDER — SODIUM CHLORIDE, SODIUM LACTATE, POTASSIUM CHLORIDE, CALCIUM CHLORIDE 600; 310; 30; 20 MG/100ML; MG/100ML; MG/100ML; MG/100ML
125 INJECTION, SOLUTION INTRAVENOUS CONTINUOUS
Status: DISCONTINUED | OUTPATIENT
Start: 2023-05-23 | End: 2023-05-23

## 2023-05-23 RX ORDER — FENTANYL CITRATE 50 UG/ML
INJECTION, SOLUTION INTRAMUSCULAR; INTRAVENOUS AS NEEDED
Status: DISCONTINUED | OUTPATIENT
Start: 2023-05-23 | End: 2023-05-23

## 2023-05-23 RX ORDER — ROCURONIUM BROMIDE 10 MG/ML
INJECTION, SOLUTION INTRAVENOUS AS NEEDED
Status: DISCONTINUED | OUTPATIENT
Start: 2023-05-23 | End: 2023-05-23

## 2023-05-23 RX ORDER — INSULIN LISPRO 100 [IU]/ML
1-5 INJECTION, SOLUTION INTRAVENOUS; SUBCUTANEOUS
Status: DISCONTINUED | OUTPATIENT
Start: 2023-05-23 | End: 2023-05-24 | Stop reason: HOSPADM

## 2023-05-23 RX ORDER — PROPOFOL 10 MG/ML
INJECTION, EMULSION INTRAVENOUS AS NEEDED
Status: DISCONTINUED | OUTPATIENT
Start: 2023-05-23 | End: 2023-05-23

## 2023-05-23 RX ADMIN — CEFAZOLIN 1000 MG: 1 INJECTION, POWDER, FOR SOLUTION INTRAMUSCULAR; INTRAVENOUS at 15:50

## 2023-05-23 RX ADMIN — REMIFENTANIL HYDROCHLORIDE 0.1 MCG/KG/MIN: 1 INJECTION, POWDER, LYOPHILIZED, FOR SOLUTION INTRAVENOUS at 16:31

## 2023-05-23 RX ADMIN — HYDROMORPHONE HYDROCHLORIDE 0.2 MG: 0.2 INJECTION, SOLUTION INTRAMUSCULAR; INTRAVENOUS; SUBCUTANEOUS at 19:01

## 2023-05-23 RX ADMIN — FENTANYL CITRATE 50 MCG: 50 INJECTION, SOLUTION INTRAMUSCULAR; INTRAVENOUS at 15:49

## 2023-05-23 RX ADMIN — DEXMEDETOMIDINE HCL 8 MCG: 100 INJECTION INTRAVENOUS at 15:45

## 2023-05-23 RX ADMIN — DEXAMETHASONE SODIUM PHOSPHATE 10 MG: 4 INJECTION INTRA-ARTICULAR; INTRALESIONAL; INTRAMUSCULAR; INTRAVENOUS; SOFT TISSUE at 16:02

## 2023-05-23 RX ADMIN — HEPARIN SODIUM 5000 UNITS: 5000 INJECTION INTRAVENOUS; SUBCUTANEOUS at 21:18

## 2023-05-23 RX ADMIN — SUGAMMADEX 100 MG: 100 INJECTION, SOLUTION INTRAVENOUS at 16:21

## 2023-05-23 RX ADMIN — HYDROMORPHONE HYDROCHLORIDE 0.2 MG: 1 INJECTION, SOLUTION INTRAMUSCULAR; INTRAVENOUS; SUBCUTANEOUS at 23:25

## 2023-05-23 RX ADMIN — MIDAZOLAM 2 MG: 1 INJECTION INTRAMUSCULAR; INTRAVENOUS at 15:38

## 2023-05-23 RX ADMIN — PROPOFOL 130 MG: 10 INJECTION, EMULSION INTRAVENOUS at 15:49

## 2023-05-23 RX ADMIN — Medication 100 MCG: at 16:08

## 2023-05-23 RX ADMIN — EPHEDRINE SULFATE 5 MG: 50 INJECTION INTRAVENOUS at 16:50

## 2023-05-23 RX ADMIN — OXYCODONE HYDROCHLORIDE 5 MG: 5 TABLET ORAL at 20:13

## 2023-05-23 RX ADMIN — EPHEDRINE SULFATE 10 MG: 50 INJECTION INTRAVENOUS at 17:18

## 2023-05-23 RX ADMIN — PHENYLEPHRINE HYDROCHLORIDE 40 MCG/MIN: 10 INJECTION INTRAVENOUS at 16:06

## 2023-05-23 RX ADMIN — CALCIUM CARBONATE (ANTACID) CHEW TAB 500 MG 1000 MG: 500 CHEW TAB at 20:09

## 2023-05-23 RX ADMIN — PROPOFOL 30 MG: 10 INJECTION, EMULSION INTRAVENOUS at 16:27

## 2023-05-23 RX ADMIN — OXYMETAZOLINE HCL 2 SPRAY: 0.05 SPRAY NASAL at 15:36

## 2023-05-23 RX ADMIN — SODIUM CHLORIDE, SODIUM LACTATE, POTASSIUM CHLORIDE, AND CALCIUM CHLORIDE 125 ML/HR: .6; .31; .03; .02 INJECTION, SOLUTION INTRAVENOUS at 13:25

## 2023-05-23 RX ADMIN — PROPOFOL 20 MG: 10 INJECTION, EMULSION INTRAVENOUS at 15:46

## 2023-05-23 RX ADMIN — ROCURONIUM BROMIDE 30 MG: 10 INJECTION, SOLUTION INTRAVENOUS at 15:56

## 2023-05-23 RX ADMIN — LIDOCAINE HYDROCHLORIDE 50 MG: 10 INJECTION, SOLUTION EPIDURAL; INFILTRATION; INTRACAUDAL; PERINEURAL at 15:45

## 2023-05-23 RX ADMIN — DEXMEDETOMIDINE HCL 8 MCG: 100 INJECTION INTRAVENOUS at 15:44

## 2023-05-23 RX ADMIN — SODIUM CHLORIDE, SODIUM LACTATE, POTASSIUM CHLORIDE, AND CALCIUM CHLORIDE: .6; .31; .03; .02 INJECTION, SOLUTION INTRAVENOUS at 15:38

## 2023-05-23 RX ADMIN — Medication 200 MCG: at 15:53

## 2023-05-23 RX ADMIN — Medication 100 MG: at 15:49

## 2023-05-23 RX ADMIN — ONDANSETRON 4 MG: 2 INJECTION INTRAMUSCULAR; INTRAVENOUS at 17:33

## 2023-05-23 NOTE — OP NOTE
OPERATIVE REPORT  PATIENT NAME: Elena Martinez    :  1955  MRN: [id number]  Pt Location: [facility] OR ROOM 15    SURGERY DATE: 2023    Surgeon(s) and Role:     * Marisol Mancera MD - Primary     * Felipa Arita MD - Assisting    Preop Diagnosis:  Thyroid nodule [E04 1]    Post-Op Diagnosis Codes: * Thyroid nodule [E04 1]    Procedure(s):  TOTAL THYROIDECTOMY  PRE-INDUCTION AWAKE NASAL FIBEROPTIC LARYNGOSCOPY    Specimen(s):  ID Type Source Tests Collected by Time Destination   1 : Total thyroid Tissue Thyroid TISSUE EXAM Marisol Mancera MD 2023 1622        Estimated Blood Loss:   Minimal    Drains:  * No LDAs found *    Anesthesia Type:   General    Operative Indications:  Thyroid nodule [E04 1]  Concerning thyroid nodule on biopsy    Operative Findings:  Benign-appearing findings with no suggestion of local invasion    Complications:   None    Procedure and Technique:  The patient was brought into the operating room and identified by a proper timeout  A flexible laryngoscopy was performed and confirmed parallel and equal cord movement  Following this she was intubated by the anesthesia team  She was then positioned with a shoulder roll behind the scapula and the head in the sniffing position  The neck was then prepped and draped in the usual sterile fashion  A timeout was performed  Following this, the skin at and around the planned cervical incision site was anesthetized with local anesthetic  Next, a 4 cm incision was made 2 fingerbreadths above the sternal notch  Cautery was used to dissect through the dermis and subcutaneous fat  The platysma was transected with cautery  We then created flaps superiorly and inferiorly deep to the platysma  Lonestar retractors were then placed for exposure  The strap muscles were divided at the midline with electrocautery to expose the thyroid  The strap muscles were mobilized off the anterolateral aspect of the left thyroid lobe   The thyroid nodule was visualized and appeared to be soft and mobile Using traction on the superior pole, the superior pole vessels were divided with the Harmonic Scalpel  We rotated the gland anteromedially  The parathyroid glands were visualized and preserved by means of close capsular dissection  We then visualized recurrent laryngeal nerve as we medialized the lobe  This was confirmed with nerve stimulation and contraction of the larynx  The ligament of berry was then clamped and tied off with a 3-0 vicryl suture  The lobe was then freed from anterior surface of the trachea with cautery  Valsalva was applied and the operative field inspected for bleeding  None was seen  Surgicel was placed in the operative field  We then turned our attention to the right thyroid lobe  The strap muscles were mobilized off the anterolateral aspect of the left thyroid lobe  Using traction on the superior pole, the superior pole vessels were divided with the Harmonic Scalpel  We rotated the gland anteromedially  A smaller nodule was evident and appeared soft  The parathyroid glands were visualized and preserved by means of close capsular dissection  We then visualized recurrent laryngeal nerve as we medialized the lobe  This was confirmed with nerve stimulation and contraction of the larynx  The ligament of berry was then clamped and tied off with a 3-0 vicryl suture  The lobe was then freed from anterior surface of the trachea with cautery  Valsalva was applied and the operative field inspected for bleeding  None was seen  Surgicel was placed in the operative field  The strap muscle were then reapproximated with 3-0 vicryl and the platysma was closed with 3-0 vicryl as well  The skin was closed with 4-0 absorbable stratafix followed by skin glue  I was present for the entire procedure      Patient Disposition:  PACU         SIGNATURE: Liset Oden MD  DATE: May 23, 2023  TIME: 5:43 PM

## 2023-05-23 NOTE — INTERVAL H&P NOTE
H&P reviewed  After examining the patient I find no changes in the patients condition since the H&P had been written      Vitals:    05/23/23 1312   BP: 101/74   Pulse: 65   Resp: 16   Temp: 98 °F (36 7 °C)   SpO2: 100%

## 2023-05-23 NOTE — ANESTHESIA POSTPROCEDURE EVALUATION
Post-Op Assessment Note    CV Status:  Stable  Pain Score: 0    Pain management: adequate     Mental Status:  Sleepy and arousable   Hydration Status:  Euvolemic   PONV Controlled:  Controlled   Airway Patency:  Patent      Post Op Vitals Reviewed: Yes      Staff: CRNA   Comments: Pt able to maintain own airway, VSS, report to recovery RN        No notable events documented      BP   161/87   Temp   97 2   Pulse  70   Resp   16   SpO2   100%

## 2023-05-23 NOTE — ANESTHESIA PREPROCEDURE EVALUATION
Procedure:  TOTAL THYROIDECTOMY (Neck)  PRE-INDUCTION AWAKE NASAL FIBEROPTIC LARYNGOSCOPY (Throat)    Relevant Problems   CARDIO   (+) Hyperlipidemia   (+) Hypertension      GYN   (+) History of hysterectomy      Endocrine   (+) Diabetes mellitus (HCC)   (+) Thyroid nodule        Physical Exam    Airway    Mallampati score: III  TM Distance: >3 FB  Neck ROM: full     Dental       Cardiovascular      Pulmonary      Other Findings        Anesthesia Plan  ASA Score- 2     Anesthesia Type- general with ASA Monitors  Additional Monitors:   Airway Plan: ETT  Plan Factors-    Chart reviewed  EKG reviewed  Existing labs reviewed  Patient summary reviewed  Induction- intravenous  Postoperative Plan- Plan for postoperative opioid use  Informed Consent- Anesthetic plan and risks discussed with patient  I personally reviewed this patient with the CRNA  Discussed and agreed on the Anesthesia Plan with the CRNA  Skyler Arellano

## 2023-05-23 NOTE — PLAN OF CARE
Problem: MOBILITY - ADULT  Goal: Maintain or return to baseline ADL function  Description: INTERVENTIONS:  -  Assess patient's ability to carry out ADLs; assess patient's baseline for ADL function and identify physical deficits which impact ability to perform ADLs (bathing, care of mouth/teeth, toileting, grooming, dressing, etc )  - Assess/evaluate cause of self-care deficits   - Assess range of motion  - Assess patient's mobility; develop plan if impaired  - Assess patient's need for assistive devices and provide as appropriate  - Encourage maximum independence but intervene and supervise when necessary  - Involve family in performance of ADLs  - Assess for home care needs following discharge   - Consider OT consult to assist with ADL evaluation and planning for discharge  - Provide patient education as appropriate  Outcome: Progressing  Goal: Maintains/Returns to pre admission functional level  Description: INTERVENTIONS:  - Perform BMAT or MOVE assessment daily    - Set and communicate daily mobility goal to care team and patient/family/caregiver  - Collaborate with rehabilitation services on mobility goals if consulted  - Perform Range of Motion  times a day  - Reposition patient every  hours    - Dangle patient  times a day  - Stand patient  times a day  - Ambulate patient  times a day  - Out of bed to chair  times a day   - Out of bed for meals  times a day  - Out of bed for toileting  - Record patient progress and toleration of activity level   Outcome: Progressing     Problem: PAIN - ADULT  Goal: Verbalizes/displays adequate comfort level or baseline comfort level  Description: Interventions:  - Encourage patient to monitor pain and request assistance  - Assess pain using appropriate pain scale  - Administer analgesics based on type and severity of pain and evaluate response  - Implement non-pharmacological measures as appropriate and evaluate response  - Consider cultural and social influences on pain and pain management  - Notify physician/advanced practitioner if interventions unsuccessful or patient reports new pain  Outcome: Progressing     Problem: INFECTION - ADULT  Goal: Absence or prevention of progression during hospitalization  Description: INTERVENTIONS:  - Assess and monitor for signs and symptoms of infection  - Monitor lab/diagnostic results  - Monitor all insertion sites, i e  indwelling lines, tubes, and drains  - Monitor endotracheal if appropriate and nasal secretions for changes in amount and color  - [place] appropriate cooling/warming therapies per order  - Administer medications as ordered  - Instruct and encourage patient and family to use good hand hygiene technique  - Identify and instruct in appropriate isolation precautions for identified infection/condition  Outcome: Progressing  Goal: Absence of fever/infection during neutropenic period  Description: INTERVENTIONS:  - Monitor WBC    Outcome: Progressing     Problem: SAFETY ADULT  Goal: Maintain or return to baseline ADL function  Description: INTERVENTIONS:  -  Assess patient's ability to carry out ADLs; assess patient's baseline for ADL function and identify physical deficits which impact ability to perform ADLs (bathing, care of mouth/teeth, toileting, grooming, dressing, etc )  - Assess/evaluate cause of self-care deficits   - Assess range of motion  - Assess patient's mobility; develop plan if impaired  - Assess patient's need for assistive devices and provide as appropriate  - Encourage maximum independence but intervene and supervise when necessary  - Involve family in performance of ADLs  - Assess for home care needs following discharge   - Consider OT consult to assist with ADL evaluation and planning for discharge  - Provide patient education as appropriate  Outcome: Progressing  Goal: Maintains/Returns to pre admission functional level  Description: INTERVENTIONS:  - Perform BMAT or MOVE assessment daily    - Set and communicate daily mobility goal to care team and patient/family/caregiver  - Collaborate with rehabilitation services on mobility goals if consulted  - Perform Range of Motion  times a day  - Reposition patient every  hours    - Dangle patient  times a day  - Stand patient  times a day  - Ambulate patient  times a day  - Out of bed to chair  times a day   - Out of bed for meals  times a day  - Out of bed for toileting  - Record patient progress and toleration of activity level   Outcome: Progressing  Goal: Patient will remain free of falls  Description: INTERVENTIONS:  - Educate patient/family on patient safety including physical limitations  - Instruct patient to call for assistance with activity   - Consult OT/PT to assist with strengthening/mobility   - Keep Call bell within reach  - Keep bed low and locked with side rails adjusted as appropriate  - Keep care items and personal belongings within reach  - Initiate and maintain comfort rounds  - Make Fall Risk Sign visible to staff  - Offer Toileting every  Hours, in advance of need  - Initiate/Maintain alarm  - Obtain necessary fall risk management equipment  - Apply yellow socks and bracelet for high fall risk patients  - Consider moving patient to room near nurses station  Outcome: Progressing     Problem: DISCHARGE PLANNING  Goal: Discharge to home or other facility with appropriate resources  Description: INTERVENTIONS:  - Identify barriers to discharge w/patient and caregiver  - Arrange for needed discharge resources and transportation as appropriate  - Identify discharge learning needs (meds, wound care, etc )  - Arrange for interpretive services to assist at discharge as needed  - Refer to Case Management Department for coordinating discharge planning if the patient needs post-hospital services based on physician/advanced practitioner order or complex needs related to functional status, cognitive ability, or social support system  Outcome: Progressing Problem: Knowledge Deficit  Goal: Patient/family/caregiver demonstrates understanding of disease process, treatment plan, medications, and discharge instructions  Description: Complete learning assessment and assess knowledge base    Interventions:  - Provide teaching at level of understanding  - Provide teaching via preferred learning methods  Outcome: Progressing

## 2023-05-24 VITALS
RESPIRATION RATE: 14 BRPM | BODY MASS INDEX: 23.41 KG/M2 | SYSTOLIC BLOOD PRESSURE: 136 MMHG | OXYGEN SATURATION: 97 % | TEMPERATURE: 96.4 F | HEART RATE: 55 BPM | WEIGHT: 124 LBS | DIASTOLIC BLOOD PRESSURE: 87 MMHG | HEIGHT: 61 IN

## 2023-05-24 LAB
ANION GAP SERPL CALCULATED.3IONS-SCNC: 0 MMOL/L (ref 4–13)
BASOPHILS # BLD AUTO: 0.01 THOUSANDS/ÂΜL (ref 0–0.1)
BASOPHILS NFR BLD AUTO: 0 % (ref 0–1)
BUN SERPL-MCNC: 20 MG/DL (ref 5–25)
CALCIUM SERPL-MCNC: 9.3 MG/DL (ref 8.3–10.1)
CHLORIDE SERPL-SCNC: 108 MMOL/L (ref 96–108)
CO2 SERPL-SCNC: 26 MMOL/L (ref 21–32)
CREAT SERPL-MCNC: 1.04 MG/DL (ref 0.6–1.3)
EOSINOPHIL # BLD AUTO: 0 THOUSAND/ÂΜL (ref 0–0.61)
EOSINOPHIL NFR BLD AUTO: 0 % (ref 0–6)
ERYTHROCYTE [DISTWIDTH] IN BLOOD BY AUTOMATED COUNT: 12.9 % (ref 11.6–15.1)
GFR SERPL CREATININE-BSD FRML MDRD: 56 ML/MIN/1.73SQ M
GLUCOSE SERPL-MCNC: 105 MG/DL (ref 65–140)
GLUCOSE SERPL-MCNC: 126 MG/DL (ref 65–140)
GLUCOSE SERPL-MCNC: 137 MG/DL (ref 65–140)
HCT VFR BLD AUTO: 36.8 % (ref 34.8–46.1)
HGB BLD-MCNC: 12.5 G/DL (ref 11.5–15.4)
IMM GRANULOCYTES # BLD AUTO: 0.03 THOUSAND/UL (ref 0–0.2)
IMM GRANULOCYTES NFR BLD AUTO: 0 % (ref 0–2)
LYMPHOCYTES # BLD AUTO: 0.8 THOUSANDS/ÂΜL (ref 0.6–4.47)
LYMPHOCYTES NFR BLD AUTO: 10 % (ref 14–44)
MCH RBC QN AUTO: 31.8 PG (ref 26.8–34.3)
MCHC RBC AUTO-ENTMCNC: 34 G/DL (ref 31.4–37.4)
MCV RBC AUTO: 94 FL (ref 82–98)
MONOCYTES # BLD AUTO: 0.36 THOUSAND/ÂΜL (ref 0.17–1.22)
MONOCYTES NFR BLD AUTO: 4 % (ref 4–12)
NEUTROPHILS # BLD AUTO: 7.13 THOUSANDS/ÂΜL (ref 1.85–7.62)
NEUTS SEG NFR BLD AUTO: 86 % (ref 43–75)
NRBC BLD AUTO-RTO: 0 /100 WBCS
PLATELET # BLD AUTO: 187 THOUSANDS/UL (ref 149–390)
PMV BLD AUTO: 10.6 FL (ref 8.9–12.7)
POTASSIUM SERPL-SCNC: 4.5 MMOL/L (ref 3.5–5.3)
RBC # BLD AUTO: 3.93 MILLION/UL (ref 3.81–5.12)
SODIUM SERPL-SCNC: 134 MMOL/L (ref 135–147)
WBC # BLD AUTO: 8.33 THOUSAND/UL (ref 4.31–10.16)

## 2023-05-24 RX ORDER — ACETAMINOPHEN 325 MG/1
650 TABLET ORAL EVERY 6 HOURS PRN
Refills: 0 | COMMUNITY
Start: 2023-05-24

## 2023-05-24 RX ORDER — CALCIUM CARBONATE 500 MG/1
1000 TABLET, CHEWABLE ORAL 4 TIMES DAILY
Refills: 0 | COMMUNITY
Start: 2023-05-24

## 2023-05-24 RX ADMIN — SODIUM CHLORIDE, SODIUM LACTATE, POTASSIUM CHLORIDE, AND CALCIUM CHLORIDE 75 ML/HR: .6; .31; .03; .02 INJECTION, SOLUTION INTRAVENOUS at 08:09

## 2023-05-24 RX ADMIN — HEPARIN SODIUM 5000 UNITS: 5000 INJECTION INTRAVENOUS; SUBCUTANEOUS at 05:43

## 2023-05-24 RX ADMIN — CALCIUM CARBONATE (ANTACID) CHEW TAB 500 MG 1000 MG: 500 CHEW TAB at 09:42

## 2023-05-24 RX ADMIN — LEVOTHYROXINE SODIUM 100 MCG: 100 TABLET ORAL at 05:40

## 2023-05-24 RX ADMIN — CALCIUM CARBONATE (ANTACID) CHEW TAB 500 MG 1000 MG: 500 CHEW TAB at 11:25

## 2023-05-24 NOTE — DISCHARGE SUMMARY
Discharge Summary - Surgical Oncology   Carina Umm 79 y o  female MRN: [id number]  Unit/Bed#: [facility] 804-01 Encounter: [id number]    Admission Date: 5/23/2023     Admitting Diagnosis: Thyroid nodule [E04 1]    HPI: Jaylon Yan  is a 51-year-old woman who was found to have an enlarged thyroid on her OB/GYN exam   Patient has been asymptomatic with normal TSH levels  She has no pain or difficulty swallowing  An ultrasound revealed a 2 5 cm nodule of the isthmus and a smaller right thyroid nodule  An FNA of the isthmus nodule resulted in a [place] 4 with a 50% risk on affirmation  Patient is now being admitted for surgical intervention  Procedures Performed: 5/23/2023 total thyroidectomy -Dr Joaquín Fuller Course: Patient has done well postoperatively  Her postop calcium level is 8 9 and postop day #1 her calcium level is 9 3  There is no perioral numbness or tingling  She is able to tolerate a diabetic diet  She is up and ambulating the halls  Her anterior neck incision is clean and dry without ecchymosis or hematoma  Patient will be discharged home today and follow-up with Dr Prasad Horton at the [facility] office on 6/14/2023 at 10:15 in the morning  Patient has scripts for levothyroxine 100 mcg daily, Ultram 50 mg as needed for pain  She is to go home on calcium carbonate tabs 1000 mg 4 times daily  It was explained to patient that if there is any perioral numbness or tingling to take 2 extra calcium tablets and call the office  Patient may shower  No heavy lifting for the next few days  Pathology pending    Complications: None    Discharge Diagnosis: Thyroid nodule    Discharge Date: 5/24/2023    Condition at Discharge: Good    Discharge instructions/Information to patient and family:   See after visit summary for information provided to patient and family  Provisions for Follow-Up Care:  See after visit summary for information related to follow-up care and any pertinent home health orders  Disposition: Home    Planned Readmission: No    Discharge Statement   I spent 30 minutes discharging the patient  This time was spent on the day of discharge  I had direct contact with the patient on the day of discharge  Additional documentation is required if more than 30 minutes were spent on discharge  Discharge Medications:  See after visit summary for reconciled discharge medications provided to patient and family

## 2023-05-24 NOTE — PROGRESS NOTES
"Progress Note - Surgical Oncology   [facility] 79 y o  female MRN: [id number]  Unit/Bed#: [facility] 804-01 Encounter: [id number]    Assessment:  73yo F with thyroid nodule s/p total thyroidectomy 5/23    Post op PTH 32 7  Post op Ca 8 9  AM Ca pending    Plan:  - f/u AM Ca  - cont calcium supplementation  - cont IS, OOB  - anticipate discharge home today    Subjective/Objective   Subjective:   No acute events overnight  Pain well controlled  Drinking ok, has not eaten  Voiding  Denies perioral numbness and tingling  Objective:     Blood pressure 144/87, pulse 66, temperature (!) 96 3 °F (35 7 °C), resp  rate 14, height 5' 1\" (1 549 m), weight 56 2 kg (124 lb), SpO2 97 %, not currently breastfeeding  ,Body mass index is 23 43 kg/m²  Intake/Output Summary (Last 24 hours) at 5/24/2023 0609  Last data filed at 5/23/2023 1714  Gross per 24 hour   Intake 1000 ml   Output --   Net 1000 ml       Invasive Devices     Peripheral Intravenous Line  Duration           Peripheral IV 05/23/23 Dorsal (posterior); Left Wrist <1 day    Peripheral IV 05/23/23 Right Forearm <1 day                Physical Exam:  General: No acute distress  Neuro: alert and oriented  HEENT: moist mucous membranes  Neck: incision c/d/i, soft with no evidence of hematoma  CV: Well perfused, regular rate and rhythm  Lungs: Normal work of breathing, no increased respiratory effort  Abdomen: Soft, non-tender, non-distended    Extremities: No edema, clubbing or cyanosis  Skin: Warm, dry        Chelsea Tolentino MD  General Surgery PGY1    "

## 2023-06-08 PROBLEM — C73 THYROID CANCER (HCC): Status: ACTIVE | Noted: 2023-06-08

## 2023-06-12 ENCOUNTER — TELEPHONE (OUTPATIENT)
Dept: ENDOCRINOLOGY | Facility: CLINIC | Age: 68
End: 2023-06-12

## 2023-06-12 DIAGNOSIS — E04.1 THYROID NODULE: Primary | ICD-10-CM

## 2023-06-12 NOTE — TELEPHONE ENCOUNTER
It doesn't interfere  She can take both medications  I placed an order a thyroid panel she should get blood work done in 2-4 weeks

## 2023-06-12 NOTE — TELEPHONE ENCOUNTER
Patient left a message, she had a total Thyroidectomy surgery on 05/23/2023 and she started taking Levothyroxine, Pt was to know if it is ok to take the medication  With her weekly Ozempic , Please advise

## 2023-06-14 ENCOUNTER — OFFICE VISIT (OUTPATIENT)
Dept: SURGICAL ONCOLOGY | Facility: CLINIC | Age: 68
End: 2023-06-14

## 2023-06-14 VITALS
TEMPERATURE: 97.6 F | HEIGHT: 61 IN | BODY MASS INDEX: 23.03 KG/M2 | OXYGEN SATURATION: 98 % | HEART RATE: 80 BPM | SYSTOLIC BLOOD PRESSURE: 148 MMHG | WEIGHT: 122 LBS | RESPIRATION RATE: 16 BRPM | DIASTOLIC BLOOD PRESSURE: 90 MMHG

## 2023-06-14 DIAGNOSIS — E04.1 THYROID NODULE: ICD-10-CM

## 2023-06-14 DIAGNOSIS — C73 THYROID CANCER (HCC): Primary | ICD-10-CM

## 2023-06-14 PROCEDURE — 99024 POSTOP FOLLOW-UP VISIT: CPT | Performed by: STUDENT IN AN ORGANIZED HEALTH CARE EDUCATION/TRAINING PROGRAM

## 2023-06-14 RX ORDER — LEVOTHYROXINE SODIUM 0.1 MG/1
100 TABLET ORAL DAILY
Qty: 30 TABLET | Refills: 0 | Status: SHIPPED | OUTPATIENT
Start: 2023-06-14

## 2023-06-14 RX ORDER — LOSARTAN POTASSIUM 50 MG/1
TABLET ORAL
COMMUNITY
Start: 2023-06-05

## 2023-06-14 RX ORDER — SEMAGLUTIDE 0.68 MG/ML
INJECTION, SOLUTION SUBCUTANEOUS
COMMUNITY
Start: 2023-06-05

## 2023-06-14 NOTE — PROGRESS NOTES
Surgical Oncology Consultation F/U    [address] [facility] SURGICAL ONCOLOGY [place]  [facility] [address] [phone]    Patient:  Stacy Dec  1955  [id number]    Primary Care provider:  Venice Billings MD  [address] [id number]    Referring provider:  No referring provider defined for this encounter  Diagnoses and all orders for this visit:    Thyroid cancer ([facility] )  -     TSH, 3rd generation; Future  -     TSH, 3rd generation    Thyroid nodule  -     levothyroxine (Euthyrox) 100 mcg tablet; Take 1 tablet (100 mcg total) by mouth daily    Other orders  -     Ozempic, 0 25 or 0 5 MG/DOSE, 2 MG/3ML injection pen  -     losartan (COZAAR) 50 mg tablet        Chief Complaint   Patient presents with   • Post-op     Patient being seen for post op  Total Thyroidectomy 5/23/2023  Return in about 6 months (around 12/14/2023) for Office Visit  Oncology History   Thyroid cancer ([facility] )   5/23/2023 Surgery    A   Thyroid and lymph node (1); total thyroidectomy:       - Papillary thyroid carcinoma, infiltrative follicular variant, 0 55 cm, see note       - Carcinoma involves right thyroid lobe       - Angioinvasion: Not identified       - Lymphatic invasion: Not identified       - All margins negative for carcinoma       - One lymph node, negative for malignancy (0/1)      - Background thyroid with follicular nodular disease and previous biopsy site changes       - See synoptic report     6/8/2023 Initial Diagnosis    Thyroid cancer ([facility] )     6/14/2023 -  Cancer Staged    Staging form: Thyroid - Differentiated and Anaplastic, AJCC 8th Edition  - Pathologic: pT1a, pN0, cM0 - Signed by Jasper Sosa MD on 6/14/2023  Lymph node metastasis: Absent           History of Present Illness  :   78 yo female with new diagnosis of 2 5 cm isthmus nodule of thyroid now s/p total thyroid with 0 13 cm infiltrative follicular variant papillary thyroid cancer  The patient is doing very well with no neck pain, nausea vomiting, trouble swallowing, pain with swallowing  She does have some residual transient hoarseness which is improving  No signs or symptoms of infection  No signs or symptoms of hyper or hypothyroidism  Calcium levels within normal limits and no signs or symptoms of hypocalcemia without calcium supplementation  Review of Systems  Complete ROS Surg Onc:   Constitutional: The patient denies new or recent history of general fatigue, no recent weight loss, no change in appetite  Eyes: No complaints of visual problems, no scleral icterus  ENT: No complaints of ear pain, no hoarseness, no difficulty swallowing,  no tinnitus and no new masses in head, oral cavity, or neck  Cardiovascular: No complaints of chest pain, no palpitations, no ankle edema  Respiratory: No complaints of shortness of breath, no cough  Gastrointestinal: No complaints of jaundice, no bloody stools, no pale stools  Genitourinary: No complaints of dysuria, no hematuria, no nocturia, no frequent urination, no urethral discharge  Musculoskeletal: No complaints of weakness, paralysis, joint stiffness or arthralgias  Integumentary: No complaints of rash, no new lesions  Neurological: No complaints of convulsions, no seizures, no dizziness  Hematologic/Lymphatic: No complaints of easy bruising  Endocrine:  No hot or cold intolerance  No polydipsia, polyphagia, or polyuria  Allergy/immunology:  No environmental allergies  No food allergies  Not immunocompromised        Patient Active Problem List   Diagnosis   • Diabetes mellitus ([address] )   • Hyperlipidemia   • Hypertension   • History of hysterectomy   • BRCA negative - Negative Sariah Mtz 1/2018   • Family history of ovarian cancer - mother in 62s   • Family history of colon cancer - brother in 45s   • History of right ovarian cyst - stable   • Encounter for gynecological examination (general) (routine) without abnormal findings   • Thyroid nodule   • Thyroid cancer [facility])     Past Medical History:   Diagnosis Date   • Diabetes mellitus ([facility] )     Type II   • Hyperlipidemia    • Hypertension      Past Surgical History:   Procedure Laterality Date   • COLONOSCOPY  06/2021    Due 2026   • HYSTERECTOMY      [facility]   • LARYNGOSCOPY N/A 5/23/2023    Procedure: PRE-INDUCTION AWAKE NASAL FIBEROPTIC LARYNGOSCOPY;  Surgeon: Hussein Nunez MD;  [facility];  Service: Surgical Oncology   • MAMMO (HISTORICAL) Bilateral 12/13/2021    [address]   • THYROIDECTOMY N/A 5/23/2023    Procedure: TOTAL THYROIDECTOMY;  Surgeon: Hussein Nunez MD;  [facility]: [facility];  Service: Surgical Oncology   • US GUIDED THYROID BIOPSY  4/4/2023     Family History   Problem Relation Age of Onset   • Diabetes Mother    • Ovarian cancer Mother 61   • Kidney failure Father    • Heart disease Sister    • No Known Problems Sister    • No Known Problems Sister    • No Known Problems Sister    • No Known Problems Sister    • Colon cancer Brother 39   • No Known Problems Brother    • No Known Problems Brother    • No Known Problems Brother    • No Known Problems Son    • No Known Problems Son    • Diabetes Maternal Grandmother    • Hypertension Maternal Grandmother    • Diabetes Maternal Grandfather    • Hypertension Maternal Grandfather    • No Known Problems Paternal Grandmother    • No Known Problems Paternal Grandfather    • No Known Problems Maternal Aunt    • No Known Problems Maternal Aunt    • No Known Problems Maternal Aunt    • No Known Problems Maternal Aunt    • No Known Problems Maternal Aunt    • No Known Problems Maternal Aunt    • No Known Problems Paternal Aunt    • No Known Problems Paternal Aunt    • Breast cancer Neg Hx    • Uterine cancer Neg Hx      Social History     Socioeconomic History   • Marital status: /Civil Union     Spouse name: Not on file   • Number of children: Not on file   • Years of education: Not on file • Highest education level: Not on file   Occupational History   • Not on file   Tobacco Use   • Smoking status: Never   • Smokeless tobacco: Never   Vaping Use   • Vaping Use: Never used   Substance and Sexual Activity   • Alcohol use: Not Currently   • Drug use: Never   • Sexual activity: Yes     Partners: Male     Birth control/protection: Surgical     Comment: no new partner in past 43 years   Other Topics Concern   • Not on file   Social History Narrative    Exercise: 2-3 times a week    Domestic violence: No     Social Determinants of Health     Financial Resource Strain: Not on file   Food Insecurity: Not on file   Transportation Needs: Not on file   Physical Activity: Not on file   Stress: Not on file   Social Connections: Not on file   Intimate Partner Violence: Not on file   Housing Stability: Not on file       Current Outpatient Medications:   •  acetaminophen (TYLENOL) 325 mg tablet, Take 2 tablets (650 mg total) by mouth every 6 (six) hours as needed for mild pain, headaches or fever, Disp: , Rfl: 0  •  calcium carbonate (TUMS) 500 mg chewable tablet, Chew 2 tablets (1,000 mg total) 4 (four) times a day, Disp: , Rfl: 0  •  cyanocobalamin 1,000 mcg/mL, INJECT 1000 MCG INTRAMUSCULARLY MONTHLY, Disp: , Rfl:   •  levothyroxine (Euthyrox) 100 mcg tablet, Take 1 tablet (100 mcg total) by mouth daily, Disp: 30 tablet, Rfl: 0  •  losartan (COZAAR) 50 mg tablet, , Disp: , Rfl:   •  metFORMIN (GLUCOPHAGE-XR) 750 mg 24 hr tablet, every 24 hours, Disp: , Rfl:   •  omega-3-acid ethyl esters (LOVAZA) 1 g capsule, Every 12 hours, Disp: , Rfl:   •  Ozempic, 0 25 or 0 5 MG/DOSE, 2 MG/3ML injection pen, , Disp: , Rfl:   •  Rosuvastatin Calcium 10 MG CPSP, every 24 hours, Disp: , Rfl:   •  Semaglutide,0 25 or 0 5MG/DOS, (Ozempic, 0 25 or 0 5 MG/DOSE,) 2 MG/1 5ML SOPN, every 7 days Saturday, Disp: , Rfl:   •  losartan (COZAAR) 25 mg tablet, every 24 hours (Patient not taking: Reported on 6/14/2023), Disp: , Rfl:   • traMADol (Ultram) 50 mg tablet, Take 1 tablet (50 mg total) by mouth every 6 (six) hours as needed for moderate pain (Patient not taking: Reported on 6/14/2023), Disp: 10 tablet, Rfl: 0  No Known Allergies    Vitals:    06/14/23 1007   BP: 148/90   Pulse: 80   Resp: 16   Temp: 97 6 °F (36 4 °C)   SpO2: 98%       Physical Exam   General: Appears well, appears stated age  Skin: Warm, anicteric  HEENT: Normocephalic, atraumatic; sclera aniceteric, mucous membranes moist; cervical nodes without adenopathy  Neck incision healing well  Cardiopulmonary: RRR, Easy WOB, no BLE edema  Abd: Flat and soft, nontender, no masses appreciated, no hepatosplenomegaly  MSK: Symmetric, no cyanosis, no overt weakness  Lymphatic: No cervical, axillary or inguinal lymphadenopathy  Neuro: Affect appropriate, no gross motor abnormalities      Pathology:  Final Diagnosis   A  & B  Thyroid, Isthmus: (Thin-Prep and smears)  Follicular neoplasm/Suspicious for follicular neoplasm ([place] Category IV) - See note  Cellular specimen  Atypical follicular cells with crowding, overlapping, occasional microfollicular patterns, increased nuclear size, prominent nucleoli, and occasional nuclear grooves  Mixed inflammatory cells and colloid      Satisfactory for evaluation  Labs: Reviewed in [place]  guided thyroid biopsy with [facility]    Result Date: 4/4/2023  Narrative: ULTRASOUND-GUIDED THYROID BIOPSY HISTORY: 74year-old female with a history of thyroid nodules  COMPARISON: Thyroid ultrasound on 1/26/2023  FINDINGS: Prior ultrasound images were reviewed  On prior thyroid ultrasound from 1/26/2023, there was a left isthmus thyroid nodule measuring 2 5 x 1 3 x 3 0 cm (TI-RADS 3) that met criteria for FNA   The patient presented today with a prescription to undergo FNA of the left isthmus thyroid nodule with molecular testing  On today's limited thyroid ultrasound, the left isthmus thyroid nodule measured 2 4 x 1 3 x 3 1 cm   The "procedure was explained to the patient, including risks of hemorrhage, infection and local injury  Informed consent was freely obtained  The patient verbalized expressed understanding of the above risks and wished to proceed with the procedure  Final standard \"time-out\" procedure performed  PROCEDURE: The neck was prepped and draped in normal sterile fashion  Under real-time ultrasound guidance and local anesthesia 5 passes with a 25 gauge needle were made through the left isthmus thyroid nodule  Cytopathology was present and deemed the specimens adequate for evaluation  Alexis Ayala 3 specimens were sent to cytology and 2 were reserved for potential molecular testing  The patient tolerated the procedure well  Postprocedure instructions were provided for the patient  I asked the patient to call us with any questions, concerns, or acute problems  The patient expressed understanding of the above  Impression: Status post successful ultrasound-guided thyroid biopsy with molecular testing of the left isthmus thyroid nodule  I reviewed the above findings and procedure with Dr Janee Ricardo  PERFORMED, DICTATED AND SIGNED BY: Janet Mendes PA-C Workstation performed: [id number]       I independently reviewed and interpreted the above laboratory and imaging data, incl US, path, afirma      Discussion/Summary:   T1 well-diff thyroid cancer, infiltrative follicular variant  The patient is doing very well postoperatively  She will proceed to endocrine in the next couple of weeks for ongoing management of her laboratory values and Synthroid  She has been instructed to continue the current weight-based dosing until seeing endocrine  I will see her back in 6 months for surveillance with ultrasound of the neck  All questions were answered today              "

## 2023-07-07 ENCOUNTER — APPOINTMENT (OUTPATIENT)
Dept: LAB | Facility: HOSPITAL | Age: 68
End: 2023-07-07
Payer: COMMERCIAL

## 2023-07-07 DIAGNOSIS — E04.1 NONTOXIC UNINODULAR GOITER: ICD-10-CM

## 2023-07-07 LAB
T4 FREE SERPL-MCNC: 1.03 NG/DL (ref 0.61–1.12)
TSH SERPL DL<=0.05 MIU/L-ACNC: 0.3 UIU/ML (ref 0.45–4.5)

## 2023-07-07 PROCEDURE — 84439 ASSAY OF FREE THYROXINE: CPT

## 2023-07-07 PROCEDURE — 86800 THYROGLOBULIN ANTIBODY: CPT

## 2023-07-07 PROCEDURE — 36415 COLL VENOUS BLD VENIPUNCTURE: CPT

## 2023-07-07 PROCEDURE — 84443 ASSAY THYROID STIM HORMONE: CPT

## 2023-07-08 LAB — THYROGLOB AB SERPL-ACNC: <1 IU/ML (ref 0–0.9)

## 2023-07-13 ENCOUNTER — APPOINTMENT (OUTPATIENT)
Dept: LAB | Facility: CLINIC | Age: 68
End: 2023-07-13
Payer: COMMERCIAL

## 2023-07-13 ENCOUNTER — OFFICE VISIT (OUTPATIENT)
Dept: ENDOCRINOLOGY | Facility: CLINIC | Age: 68
End: 2023-07-13
Payer: COMMERCIAL

## 2023-07-13 VITALS
HEART RATE: 64 BPM | WEIGHT: 121.5 LBS | DIASTOLIC BLOOD PRESSURE: 88 MMHG | HEIGHT: 61 IN | SYSTOLIC BLOOD PRESSURE: 122 MMHG | BODY MASS INDEX: 22.94 KG/M2

## 2023-07-13 DIAGNOSIS — C73 PAPILLARY CARCINOMA, FOLLICULAR VARIANT (HCC): Primary | ICD-10-CM

## 2023-07-13 DIAGNOSIS — E89.0 POSTSURGICAL HYPOTHYROIDISM: ICD-10-CM

## 2023-07-13 DIAGNOSIS — E04.1 THYROID NODULE: ICD-10-CM

## 2023-07-13 DIAGNOSIS — E89.0 POST-OPERATIVE HYPOTHYROIDISM: ICD-10-CM

## 2023-07-13 PROCEDURE — 86800 THYROGLOBULIN ANTIBODY: CPT

## 2023-07-13 PROCEDURE — 84432 ASSAY OF THYROGLOBULIN: CPT

## 2023-07-13 PROCEDURE — 99214 OFFICE O/P EST MOD 30 MIN: CPT | Performed by: STUDENT IN AN ORGANIZED HEALTH CARE EDUCATION/TRAINING PROGRAM

## 2023-07-13 RX ORDER — LEVOTHYROXINE SODIUM 0.1 MG/1
100 TABLET ORAL DAILY
Qty: 90 TABLET | Refills: 1 | Status: SHIPPED | OUTPATIENT
Start: 2023-07-13

## 2023-07-13 NOTE — PROGRESS NOTES
Lashaun Munoz 79 y.o. female MRN: [id number]    Encounter: [id number]      Assessment/Plan     Assessment: This is a 79y.o.-year-old female with past medical history of  papillary thyroid cancer, follicular variant status post total thyroidectomy in May 2023, postoperative hypothyroidism, type 2 diabetes mellitus, hypertension who presents for follow-up for papillary thyroid cancer, follicular variant status post total thyroidectomy in May 2023, postoperative hypothyroidism    Plan:  Papillary thyroid cancer, follicular variant status post total thyroidectomy in May 2023  AJCC classification PTN1 pN0  MX  Pathology consistent with 0.13 cm infiltrative follicular variant papillary thyroid cancer involving right thyroid lobe, with no angioinvasion, lymphatic invasion, extrathyroidal invasion   Given low SEPIDEH risk, she would not benefit from radioactive iodine treatment.   Discussed this with the patient about it  Discussed with the patient that we would use thyroglobulin and thyroglobulin antibody as markers and would recommend obtaining thyroglobulin level today  Thyroglobulin antibody less than 1 July 2023   We recommend getting US lymph node mapping in 6-12 months post operatively    Postoperative hypothyroidism   Continue with levothyroxine 100 mcg daily, refills provided  TSH 0.301, goal TSH is 0.1-0.5     CC:   papillary thyroid cancer, follicular variant status post total thyroidectomy in May 2023, postoperative hypothyroidism    History of Present Illness     HPI:  This is a 79y.o.-year-old female with past medical history of  papillary thyroid cancer, follicular variant status post total thyroidectomy in May 2023, postoperative hypothyroidism, type 2 diabetes mellitus, hypertension who presents for follow-up for papillary thyroid cancer, follicular variant status post total thyroidectomy in May 2023, postoperative hypothyroidism    She was last seen in March 2023 by Dr. Imelda Saeed and Dr. Ashley Medeiros    Since that visit, she had FNA for thyroid nodule, which resulted in [place] 4 with 50% malignancy risk, no [id number] or RET or PTC mutation on Afirma. She was subsequently sent to surgical oncology, got ultrasound lymph node mapping and found to have non-specific cervical lymph nodes bilaterally in zone 3 and underwent total thyroidectomy on 5/23/2023. Postoperatively, she was given calcium carbonate to be taken for 1 week and subsequently has been off of it    Pathology consistent with 0.13 cm infiltrative follicular variant papillary thyroid cancer involving right thyroid lobe, with no angioinvasion, lymphatic invasion, extrathyroidal invasion. 0/1 Lymph node nehative for malignancy     AJCC classification PTN1 pN0  MX    Current regimen: levothyroxine 100mcg daily, takes it in fasting state, waits 30 mins to eat/drink. Does not take any Ca/iron/MVI    She complains of voice hoarseness after surgery. Denies SOB, dysphagia, weight, bowel habit, skin, nail, hair related changes. She complains of heat and cold intolerance- same as before. Denies of any perioral, b/l UE and LE numbness or tingling. No radiation to head/neck/chest area. No thyroid cancers or disorders in the family    Review of Systems   Constitutional: Negative for activity change, appetite change and fatigue. HENT: Positive for voice change. Negative for congestion, sore throat and trouble swallowing. Eyes: Negative for redness and visual disturbance. Respiratory: Negative for cough and shortness of breath. Cardiovascular: Negative for palpitations and leg swelling. Gastrointestinal: Negative for abdominal pain, constipation, diarrhea, nausea and vomiting. Endocrine: Negative for cold intolerance, heat intolerance, polydipsia, polyphagia and polyuria. Genitourinary: Negative for difficulty urinating and dysuria. Musculoskeletal: Negative for gait problem and neck pain. Skin: Negative for color change.    Neurological: Negative for dizziness and syncope. Psychiatric/Behavioral: Negative for agitation and behavioral problems. All other systems reviewed and are negative.       Historical Information   Past Medical History:   Diagnosis Date   • Diabetes mellitus ([address])     Type II   • Hyperlipidemia    • Hypertension      Past Surgical History:   Procedure Laterality Date   • COLONOSCOPY  06/2021    Due 2026   • HYSTERECTOMY      [facility]   • LARYNGOSCOPY N/A 5/23/2023    Procedure: PRE-INDUCTION AWAKE NASAL FIBEROPTIC LARYNGOSCOPY;  Surgeon: Mick Solis MD;  [facility];  Service: Surgical Oncology   • MAMMO (HISTORICAL) Bilateral 12/13/2021    [address]   • THYROIDECTOMY N/A 5/23/2023    Procedure: TOTAL THYROIDECTOMY;  Surgeon: Mick Solis MD;  [facility];  Service: Surgical Oncology   • US GUIDED THYROID BIOPSY  4/4/2023     Social History   Social History     Substance and Sexual Activity   Alcohol Use Not Currently     Social History     Substance and Sexual Activity   Drug Use Never     Social History     Tobacco Use   Smoking Status Never   Smokeless Tobacco Never     Family History:   Family History   Problem Relation Age of Onset   • Diabetes Mother    • Ovarian cancer Mother 61   • Kidney failure Father    • Heart disease Sister    • No Known Problems Sister    • No Known Problems Sister    • No Known Problems Sister    • No Known Problems Sister    • Colon cancer Brother 39   • No Known Problems Brother    • No Known Problems Brother    • No Known Problems Brother    • No Known Problems Son    • No Known Problems Son    • Diabetes Maternal Grandmother    • Hypertension Maternal Grandmother    • Diabetes Maternal Grandfather    • Hypertension Maternal Grandfather    • No Known Problems Paternal Grandmother    • No Known Problems Paternal Grandfather    • No Known Problems Maternal Aunt    • No Known Problems Maternal Aunt    • No Known Problems Maternal Aunt    • No Known Problems Maternal Aunt    • No Known Problems Maternal Aunt    • No Known Problems Maternal Aunt    • No Known Problems Paternal Aunt    • No Known Problems Paternal Aunt    • Breast cancer Neg Hx    • Uterine cancer Neg Hx        Meds/Allergies   Current Outpatient Medications   Medication Sig Dispense Refill   • acetaminophen (TYLENOL) 325 mg tablet Take 2 tablets (650 mg total) by mouth every 6 (six) hours as needed for mild pain, headaches or fever  0   • cyanocobalamin 1,000 mcg/mL INJECT 1000 MCG INTRAMUSCULARLY MONTHLY     • levothyroxine (Euthyrox) 100 mcg tablet Take 1 tablet (100 mcg total) by mouth daily 30 tablet 0   • losartan (COZAAR) 50 mg tablet      • metFORMIN (GLUCOPHAGE-XR) 750 mg 24 hr tablet every 24 hours     • omega-3-acid ethyl esters (LOVAZA) 1 g capsule Every 12 hours     • Rosuvastatin Calcium 10 MG CPSP every 24 hours     • Semaglutide,0.25 or 0.5MG/DOS, (Ozempic, 0.25 or 0.5 MG/DOSE,) 2 MG/1.5ML SOPN every 7 days Saturday     • calcium carbonate (TUMS) 500 mg chewable tablet Chew 2 tablets (1,000 mg total) 4 (four) times a day (Patient not taking: Reported on 7/13/2023)  0   • losartan (COZAAR) 25 mg tablet every 24 hours (Patient not taking: Reported on 6/14/2023)     • Ozempic, 0.25 or 0.5 MG/DOSE, 2 MG/3ML injection pen  (Patient not taking: Reported on 7/13/2023)     • traMADol (Ultram) 50 mg tablet Take 1 tablet (50 mg total) by mouth every 6 (six) hours as needed for moderate pain (Patient not taking: Reported on 6/14/2023) 10 tablet 0     No current facility-administered medications for this visit. No Known Allergies    Objective   Vitals: Height 5' 1" (1.549 m), weight 55.1 kg (121 lb 8 oz), not currently breastfeeding. Physical Exam  Constitutional:       Appearance: Normal appearance. HENT:      Head: Normocephalic and atraumatic. Neck:      Comments: Anterior neck horizontal thyroidectomy scar noted  Cardiovascular:      Rate and Rhythm: Normal rate and regular rhythm. Pulses: Normal pulses. Heart sounds: Normal heart sounds. No murmur heard. No gallop. Pulmonary:      Effort: Pulmonary effort is normal.      Breath sounds: Normal breath sounds. No wheezing or rales. Abdominal:      Palpations: Abdomen is soft. Tenderness: There is no abdominal tenderness. Musculoskeletal:         General: No swelling. Cervical back: Normal range of motion and neck supple. No tenderness. Right lower leg: No edema. Left lower leg: No edema. Lymphadenopathy:      Cervical: No cervical adenopathy. Skin:     General: Skin is warm. Neurological:      Mental Status: She is alert and oriented to person, place, and time. Mental status is at baseline. Psychiatric:         Mood and Affect: Mood normal.         Behavior: Behavior normal.         The history was obtained from the review of the chart, patient. Lab Results:   Component      Latest Ref Rng 7/7/2023   TSH 3RD GENERATON      0.450 - 4.500 uIU/mL 0.301 (L)    Free T4      0.61 - 1.12 ng/dL 1.03    THYROGLOBULIN AB      0.0 - 0.9 IU/mL <1.0       Legend:  (L) Low      Imaging Studies:   Results for orders placed during the hospital encounter of 01/26/23    US thyroid    Impression  The following meet current ACR criteria for recommending ultrasound guided biopsy:      The 3.0 cm isthmic nodule. (Image number 34) (CRITERIA: TR 3, Mildly suspicious. FNA if >2.5 cm. Reference: ACR Thyroid Imaging, Reporting and Data System (TI-RADS): White Paper of the [employer]. J AM Barbara Radiol 5071;31:236-205. (additional recommendations based on American Thyroid Association 2015 guidelines.)      The study was marked in EPIC for significant notification. Workstation performed: [id number]    FNA 4/4/23:  Final Diagnosis   A. & B. Thyroid, Isthmus: (Thin-Prep and smears)  Follicular neoplasm/Suspicious for follicular neoplasm ([place] Category IV) - See note. Cellular specimen.   Atypical follicular cells with crowding, overlapping, occasional microfollicular patterns, increased nuclear size, prominent nucleoli, and occasional nuclear grooves. Mixed inflammatory cells and colloid.     Satisfactory for evaluation. Pathology 5/23/23:  Final Diagnosis   A. Thyroid and lymph node (1); total thyroidectomy:       - Papillary thyroid carcinoma, infiltrative follicular variant, 6.17 cm, see note       - Carcinoma involves right thyroid lobe       - Angioinvasion: Not identified       - Lymphatic invasion: Not identified       - All margins negative for carcinoma       - One lymph node, negative for malignancy (0/1)      - Background thyroid with follicular nodular disease and previous biopsy site changes       - See synoptic report   Electronically signed by Carlos Mason MD on 5/26/2023 at 11:33 AM         I have personally reviewed pertinent reports. Portions of the record may have been created with voice recognition software. Occasional wrong word or "sound a like" substitutions may have occurred due to the inherent limitations of voice recognition software. Read the chart carefully and recognize, using context, where substitutions have occurred.

## 2023-07-14 ENCOUNTER — TELEPHONE (OUTPATIENT)
Dept: ENDOCRINOLOGY | Facility: CLINIC | Age: 68
End: 2023-07-14

## 2023-07-14 DIAGNOSIS — E89.0 POST-OPERATIVE HYPOTHYROIDISM: ICD-10-CM

## 2023-07-14 DIAGNOSIS — C73 PAPILLARY CARCINOMA, FOLLICULAR VARIANT (HCC): Primary | ICD-10-CM

## 2023-07-14 LAB
THYROGLOB AB SERPL-ACNC: <1 IU/ML (ref 0–0.9)
THYROGLOB SERPL-MCNC: 0.2 NG/ML (ref 1.5–38.5)

## 2023-07-14 NOTE — TELEPHONE ENCOUNTER
Attempted to call the pt, however was unable to reach, left voicemail that thyroglobulin is low which is good.  We will repeat TSH, free t4, thyroglobulin and thyroglobulin antibody again in 3 months

## 2023-10-19 ENCOUNTER — APPOINTMENT (OUTPATIENT)
Dept: LAB | Facility: HOSPITAL | Age: 68
End: 2023-10-19
Payer: COMMERCIAL

## 2023-10-19 DIAGNOSIS — C73 MALIGNANT NEOPLASM OF THYROID GLAND (HCC): ICD-10-CM

## 2023-10-19 DIAGNOSIS — E89.0 POSTSURGICAL HYPOTHYROIDISM: ICD-10-CM

## 2023-10-19 LAB
T4 FREE SERPL-MCNC: 0.7 NG/DL (ref 0.61–1.12)
TSH SERPL DL<=0.05 MIU/L-ACNC: 4.03 UIU/ML (ref 0.45–4.5)

## 2023-10-19 PROCEDURE — 84432 ASSAY OF THYROGLOBULIN: CPT

## 2023-10-19 PROCEDURE — 36415 COLL VENOUS BLD VENIPUNCTURE: CPT

## 2023-10-19 PROCEDURE — 86800 THYROGLOBULIN ANTIBODY: CPT

## 2023-10-19 PROCEDURE — 84439 ASSAY OF FREE THYROXINE: CPT

## 2023-10-19 PROCEDURE — 84443 ASSAY THYROID STIM HORMONE: CPT

## 2023-10-20 ENCOUNTER — TELEPHONE (OUTPATIENT)
Dept: ENDOCRINOLOGY | Facility: CLINIC | Age: 68
End: 2023-10-20

## 2023-10-20 DIAGNOSIS — E89.0 POST-OPERATIVE HYPOTHYROIDISM: Primary | ICD-10-CM

## 2023-10-20 RX ORDER — LEVOTHYROXINE SODIUM 112 UG/1
112 TABLET ORAL DAILY
Qty: 90 TABLET | Refills: 0 | Status: SHIPPED | OUTPATIENT
Start: 2023-10-20

## 2023-10-20 NOTE — TELEPHONE ENCOUNTER
Called the patient back, patient reports compliance to generic levothyroxine 100 micrograms daily. She reports that she takes it on an empty stomach and avoids eating or drinking for up to 1 hour after taking the medication. Does not take any multivitamins, calcium, and continue medications for up to 4 hours after taking the levothyroxine. Discussed with the patient that we will message her on [employer] about the next steps.

## 2023-10-27 LAB — SCAN RESULT: NORMAL

## 2023-11-27 ENCOUNTER — APPOINTMENT (OUTPATIENT)
Dept: LAB | Facility: HOSPITAL | Age: 68
End: 2023-11-27
Payer: COMMERCIAL

## 2023-11-27 DIAGNOSIS — E89.0 POSTSURGICAL HYPOTHYROIDISM: ICD-10-CM

## 2023-11-27 LAB
T4 FREE SERPL-MCNC: 1.29 NG/DL (ref 0.61–1.12)
TSH SERPL DL<=0.05 MIU/L-ACNC: 0.39 UIU/ML (ref 0.45–4.5)

## 2023-11-27 PROCEDURE — 84439 ASSAY OF FREE THYROXINE: CPT

## 2023-11-27 PROCEDURE — 36415 COLL VENOUS BLD VENIPUNCTURE: CPT

## 2023-11-27 PROCEDURE — 84443 ASSAY THYROID STIM HORMONE: CPT

## 2023-11-30 ENCOUNTER — OFFICE VISIT (OUTPATIENT)
Dept: ENDOCRINOLOGY | Facility: CLINIC | Age: 68
End: 2023-11-30
Payer: COMMERCIAL

## 2023-11-30 VITALS
HEART RATE: 60 BPM | WEIGHT: 130 LBS | BODY MASS INDEX: 24.55 KG/M2 | SYSTOLIC BLOOD PRESSURE: 118 MMHG | DIASTOLIC BLOOD PRESSURE: 82 MMHG | HEIGHT: 61 IN

## 2023-11-30 DIAGNOSIS — C73 PAPILLARY CARCINOMA, FOLLICULAR VARIANT (HCC): Primary | ICD-10-CM

## 2023-11-30 DIAGNOSIS — E89.0 POST-OPERATIVE HYPOTHYROIDISM: ICD-10-CM

## 2023-11-30 PROCEDURE — 99214 OFFICE O/P EST MOD 30 MIN: CPT | Performed by: STUDENT IN AN ORGANIZED HEALTH CARE EDUCATION/TRAINING PROGRAM

## 2023-11-30 RX ORDER — LEVOTHYROXINE SODIUM 112 UG/1
112 TABLET ORAL DAILY
Qty: 90 TABLET | Refills: 1 | Status: SHIPPED | OUTPATIENT
Start: 2023-11-30

## 2023-11-30 NOTE — PROGRESS NOTES
Lexa Damico 79 y.o. female MRN: [id number]    Encounter: [id number]      Assessment/Plan     Assessment: This is a 79y.o.-year-old female with past medical history of  papillary thyroid cancer, follicular variant status post total thyroidectomy in May 2023, postoperative hypothyroidism, type 2 diabetes mellitus, hypertension who presents for follow-up for papillary thyroid cancer, follicular variant status post total thyroidectomy in May 2023, postoperative hypothyroidism     Plan:   Follicular variant papillary thyroid cancer, status post total thyroidectomy in May 2023  AJCC classification PT1 pN0  MX  Pathology consistent with 0.13 cm infiltrative follicular variant papillary thyroid cancer involving right thyroid lobe, with no angioinvasion, lymphatic invasion, extrathyroidal invasion   Given low SEPIDEH risk, radioactive iodine was not recommended  Thyroglobulin level 0.3 and thyroglobulin antibody less than 1.8  Discussed with the patient that we will get ultrasound if node mapping now as it has been 6 months post total thyroidectomy  We will obtain thyroglobulin and thyroglobulin antibody levels in 6 months    Postoperative hypothyroidism   TSH 0.392, free t4 1.29  Patient is clinically feeling well on the current dose of levothyroxine 112 mcg daily  Continue with levothyroxine 112 mcg daily, refills provided  TSH 0.301, goal TSH is 0.1-0.5  Will repeat TSH, free T4 in 6 months prior to next visit    CC:   papillary thyroid cancer, follicular variant status post total thyroidectomy in May 2023, postoperative hypothyroidism     History of Present Illness     HPI:  This is a 79y.o.-year-old female with past medical history of  papillary thyroid cancer, follicular variant status post total thyroidectomy in May 2023, postoperative hypothyroidism, type 2 diabetes mellitus, hypertension who presents for follow-up for papillary thyroid cancer, follicular variant status post total thyroidectomy in May 2023, postoperative hypothyroidism     She was last seen in July 2023    Thyroid cancer history:  She had incidental finding of thyroid nodule on exam found by her OB/GYN. Subsequently underwent ultrasound thyroid was found to have 2.5 x 1.3 x 3 cm cm isthmus thyroid nodule, categorized as TI-RADS 3. FNA for isthmus thyroid nodule, which resulted in [place] 4 with 50% malignancy risk, no [id number] or RET or PTC mutation on Afirma. She was subsequently sent to surgical oncology, got ultrasound lymph node mapping and found to have non-specific cervical lymph nodes bilaterally in zone 3 and underwent total thyroidectomy on 5/23/2023. Pathology consistent with 0.13 cm infiltrative follicular variant papillary thyroid cancer involving right thyroid lobe, with no angioinvasion, lymphatic invasion, extrathyroidal invasion. 0/1 Lymph node negative for malignancy     AJCC classification PT1 pN0  MX     Current regimen: levothyroxine 112 mcg daily, takes it in fasting state, waits 30 mins to eat/drink. Does not take any Ca/iron/MVI     She complains of intermittent voice hoarseness after total thyroidectomy. Denies SOB, dysphagia, weight, bowel habit, skin, nail, temperature, hair related changes. She complains of hair loss. Denies of palpitations, anxiety or tremors. Denies of any perioral, b/l UE and LE numbness or tingling. No radiation to head/neck/chest area. No thyroid cancers or disorders in the family    Review of Systems   Constitutional:  Negative for activity change, appetite change and fatigue. HENT:  Positive for voice change. Negative for congestion, sore throat and trouble swallowing. Eyes:  Negative for redness and visual disturbance. Respiratory:  Negative for cough and shortness of breath. Cardiovascular:  Negative for palpitations and leg swelling. Gastrointestinal:  Negative for abdominal pain, constipation, diarrhea, nausea and vomiting.    Endocrine: Negative for cold intolerance, heat intolerance, polydipsia, polyphagia and polyuria. Genitourinary:  Negative for difficulty urinating and dysuria. Musculoskeletal:  Negative for gait problem and neck pain. Skin:  Negative for color change. Neurological:  Negative for dizziness and syncope. Psychiatric/Behavioral:  Negative for agitation and behavioral problems. All other systems reviewed and are negative.       Historical Information   Past Medical History:   Diagnosis Date    Diabetes mellitus ([address])     Type II    Hyperlipidemia     Hypertension      Past Surgical History:   Procedure Laterality Date    COLONOSCOPY  06/2021    Due 2026    HYSTERECTOMY      RICKY    LARYNGOSCOPY N/A 5/23/2023    Procedure: PRE-INDUCTION AWAKE NASAL FIBEROPTIC LARYNGOSCOPY;  Surgeon: Hugo Roahc MD;  [facility];  Service: Surgical Oncology    MAMMO (HISTORICAL) Bilateral 12/13/2021    [facility]    THYROIDECTOMY N/A 5/23/2023    Procedure: TOTAL THYROIDECTOMY;  Surgeon: Hugo Roach MD;  [facility];  Service: Surgical Oncology    US GUIDED THYROID BIOPSY  4/4/2023     Social History   Social History     Substance and Sexual Activity   Alcohol Use Not Currently     Social History     Substance and Sexual Activity   Drug Use Never     Social History     Tobacco Use   Smoking Status Never   Smokeless Tobacco Never     Family History:   Family History   Problem Relation Age of Onset    Diabetes Mother     Ovarian cancer Mother 61    Kidney failure Father     Heart disease Sister     No Known Problems Sister     No Known Problems Sister     No Known Problems Sister     No Known Problems Sister     Colon cancer Brother 39    No Known Problems Brother     No Known Problems Brother     No Known Problems Brother     No Known Problems Son     No Known Problems Son     Diabetes Maternal Grandmother     Hypertension Maternal Grandmother     Diabetes Maternal Grandfather     Hypertension Maternal Grandfather     No Known Problems Paternal Grandmother     No Known Problems Paternal Grandfather     No Known Problems Maternal Aunt     No Known Problems Maternal Aunt     No Known Problems Maternal Aunt     No Known Problems Maternal Aunt     No Known Problems Maternal Aunt     No Known Problems Maternal Aunt     No Known Problems Paternal Aunt     No Known Problems Paternal Aunt     Breast cancer Neg Hx     Uterine cancer Neg Hx        Meds/Allergies   Current Outpatient Medications   Medication Sig Dispense Refill    cyanocobalamin 1,000 mcg/mL INJECT 1000 MCG INTRAMUSCULARLY MONTHLY      levothyroxine (Euthyrox) 112 mcg tablet Take 1 tablet (112 mcg total) by mouth daily 90 tablet 0    losartan (COZAAR) 50 mg tablet       metFORMIN (GLUCOPHAGE-XR) 750 mg 24 hr tablet every 24 hours      omega-3-acid ethyl esters (LOVAZA) 1 g capsule Every 12 hours      Rosuvastatin Calcium 10 MG CPSP every 24 hours      Semaglutide,0.25 or 0.5MG/DOS, (Ozempic, 0.25 or 0.5 MG/DOSE,) 2 MG/1.5ML SOPN 0.5 mg every 7 days Saturday      acetaminophen (TYLENOL) 325 mg tablet Take 2 tablets (650 mg total) by mouth every 6 (six) hours as needed for mild pain, headaches or fever (Patient not taking: Reported on 11/30/2023)  0    losartan (COZAAR) 25 mg tablet every 24 hours (Patient not taking: Reported on 6/14/2023)      Ozempic, 0.25 or 0.5 MG/DOSE, 2 MG/3ML injection pen  (Patient not taking: Reported on 7/13/2023)      traMADol (Ultram) 50 mg tablet Take 1 tablet (50 mg total) by mouth every 6 (six) hours as needed for moderate pain (Patient not taking: Reported on 6/14/2023) 10 tablet 0     No current facility-administered medications for this visit. No Known Allergies    Objective   Vitals: Blood pressure 118/82, pulse 60, height 5' 1" (1.549 m), weight 59 kg (130 lb), not currently breastfeeding. Physical Exam  Constitutional:       Appearance: Normal appearance. HENT:      Head: Normocephalic and atraumatic.    Neck:      Comments: Anterior neck post thyroidectomy scar is well-healed. Absent thyroid  Cardiovascular:      Rate and Rhythm: Normal rate and regular rhythm. Pulses: Normal pulses. Heart sounds: Normal heart sounds. No murmur heard. No gallop. Pulmonary:      Effort: Pulmonary effort is normal.      Breath sounds: Normal breath sounds. No wheezing or rales. Abdominal:      General: Bowel sounds are normal.      Palpations: Abdomen is soft. Tenderness: There is no abdominal tenderness. Musculoskeletal:         General: No swelling. Cervical back: Normal range of motion. No tenderness. Right lower leg: No edema. Left lower leg: No edema. Comments: Tremors noted on outstretched handsNo    Lymphadenopathy:      Cervical: No cervical adenopathy. Skin:     General: Skin is warm. Neurological:      Mental Status: She is alert and oriented to person, place, and time. Mental status is at baseline. Psychiatric:         Mood and Affect: Mood normal.         Behavior: Behavior normal.         The history was obtained from the review of the chart, patient. Lab Results:   Lab Results   Component Value Date/Time    TSH 3RD GENERATON 0.392 (L) 11/27/2023 10:06 AM    TSH 3RD GENERATON 4.030 10/19/2023 11:27 AM    TSH 3RD GENERATON 0.301 (L) 07/07/2023 10:01 AM    Free T4 1.29 (H) 11/27/2023 10:06 AM    Free T4 0.70 10/19/2023 11:27 AM    Free T4 1.03 07/07/2023 10:01 AM       Imaging Studies:     Imaging Studies:   Results for orders placed during the hospital encounter of 01/26/23     US thyroid     Impression  The following meet current ACR criteria for recommending ultrasound guided biopsy:        The 3.0 cm isthmic nodule. (Image number 34) (CRITERIA: TR 3, Mildly suspicious. FNA if >2.5 cm. Reference: ACR Thyroid Imaging, Reporting and Data System (TI-RADS): White Paper of the [employer].  J AM Barbara Radiol 5554;74:[id number]. (additional recommendations based on American Thyroid Association 2015 guidelines.)        The study was marked in EPIC for significant notification. Workstation performed: [id number]     FNA 4/4/23:  Final Diagnosis   A. & B. Thyroid, Isthmus: (Thin-Prep and smears)  Follicular neoplasm/Suspicious for follicular neoplasm ([place] Category IV) - See note. Cellular specimen. Atypical follicular cells with crowding, overlapping, occasional microfollicular patterns, increased nuclear size, prominent nucleoli, and occasional nuclear grooves. Mixed inflammatory cells and colloid. Satisfactory for evaluation. Pathology 5/23/23:  Final Diagnosis   A. Thyroid and lymph node (1); total thyroidectomy:       - Papillary thyroid carcinoma, infiltrative follicular variant, 1.46 cm, see note       - Carcinoma involves right thyroid lobe       - Angioinvasion: Not identified       - Lymphatic invasion: Not identified       - All margins negative for carcinoma       - One lymph node, negative for malignancy (0/1)      - Background thyroid with follicular nodular disease and previous biopsy site changes       - See synoptic report   Electronically signed by Tex Gastelum MD on 5/26/2023 at 11:33 AM             I have personally reviewed pertinent reports. Portions of the record may have been created with voice recognition software. Occasional wrong word or "sound a like" substitutions may have occurred due to the inherent limitations of voice recognition software. Read the chart carefully and recognize, using context, where substitutions have occurred.

## 2023-12-06 ENCOUNTER — TELEPHONE (OUTPATIENT)
Dept: OBGYN CLINIC | Facility: CLINIC | Age: 68
End: 2023-12-06

## 2023-12-06 DIAGNOSIS — Z80.41 FAMILY HISTORY OF OVARIAN CANCER: Primary | ICD-10-CM

## 2023-12-06 DIAGNOSIS — D39.11 NEOPLASM OF UNCERTAIN BEHAVIOR OF RIGHT OVARY: ICD-10-CM

## 2023-12-06 NOTE — TELEPHONE ENCOUNTER
1/13/23-OV notes  indicate patient would like to continue with annual ca125, please advise if there is any additional blood work.  Thank You

## 2023-12-06 NOTE — TELEPHONE ENCOUNTER
Patient states provider has her getting blood work every year before her check up, she does not have an order. Please advise.

## 2023-12-07 NOTE — ADDENDUM NOTE
Addended by: Carlene Wells on: 12/7/2023 07:50 AM     Modules accepted: Orders CRISIS NOTE: Presented to on call psychiatrist,  Dr. Eli Melendez, who advised Pt meets acute inpatient criteria (SI w/plan). Admit to [facility], to Dr. Carlyle Zavaleta. Pt will admit to [facility], Room 106, Bed 4; Dr. Eli Melendez to enter orders in the morning. Caity Varner, LPC, CSAC, CAD  Lauri Counselor

## 2023-12-11 PROBLEM — Z85.850 ENCOUNTER FOR FOLLOW-UP SURVEILLANCE OF THYROID CANCER: Status: ACTIVE | Noted: 2023-12-11

## 2023-12-11 PROBLEM — Z85.850 PERSONAL HISTORY OF MALIGNANT NEOPLASM OF THYROID: Status: ACTIVE | Noted: 2023-06-08

## 2023-12-11 PROBLEM — Z08 ENCOUNTER FOR FOLLOW-UP SURVEILLANCE OF THYROID CANCER: Status: ACTIVE | Noted: 2023-12-11

## 2023-12-14 ENCOUNTER — OFFICE VISIT (OUTPATIENT)
Dept: SURGICAL ONCOLOGY | Facility: CLINIC | Age: 68
End: 2023-12-14
Payer: COMMERCIAL

## 2023-12-14 ENCOUNTER — TELEPHONE (OUTPATIENT)
Dept: HEMATOLOGY ONCOLOGY | Facility: CLINIC | Age: 68
End: 2023-12-14

## 2023-12-14 ENCOUNTER — HOSPITAL ENCOUNTER (OUTPATIENT)
Dept: MAMMOGRAPHY | Facility: IMAGING CENTER | Age: 68
Discharge: HOME | End: 2023-12-14
Payer: COMMERCIAL

## 2023-12-14 VITALS
DIASTOLIC BLOOD PRESSURE: 70 MMHG | OXYGEN SATURATION: 99 % | BODY MASS INDEX: 23.75 KG/M2 | HEIGHT: 61 IN | WEIGHT: 125.8 LBS | HEART RATE: 75 BPM | TEMPERATURE: 98 F | SYSTOLIC BLOOD PRESSURE: 120 MMHG

## 2023-12-14 VITALS — WEIGHT: 130 LBS | HEIGHT: 61 IN | BODY MASS INDEX: 24.55 KG/M2

## 2023-12-14 DIAGNOSIS — Z85.850 ENCOUNTER FOR FOLLOW-UP SURVEILLANCE OF THYROID CANCER: Primary | ICD-10-CM

## 2023-12-14 DIAGNOSIS — Z12.31 BREAST CANCER SCREENING BY MAMMOGRAM: ICD-10-CM

## 2023-12-14 DIAGNOSIS — Z08 ENCOUNTER FOR FOLLOW-UP SURVEILLANCE OF THYROID CANCER: Primary | ICD-10-CM

## 2023-12-14 DIAGNOSIS — Z85.850 PERSONAL HISTORY OF MALIGNANT NEOPLASM OF THYROID: ICD-10-CM

## 2023-12-14 PROCEDURE — 77063 BREAST TOMOSYNTHESIS BI: CPT

## 2023-12-14 PROCEDURE — 99214 OFFICE O/P EST MOD 30 MIN: CPT | Performed by: STUDENT IN AN ORGANIZED HEALTH CARE EDUCATION/TRAINING PROGRAM

## 2023-12-14 PROCEDURE — 77067 SCR MAMMO BI INCL CAD: CPT

## 2023-12-14 NOTE — PROGRESS NOTES
Surgical Oncology Consultation F/U    [address] [facility] SURGICAL ONCOLOGY Vera Sneed  [address] [facility] [address] [phone]    Patient:  Conrado Tovar  1955  [id number]    Primary Care provider:  See Tomlin PA-C  [address]    Referring provider:  No referring provider defined for this encounter. Diagnoses and all orders for this visit:    Encounter for follow-up surveillance of thyroid cancer    Personal history of malignant neoplasm of thyroid        Chief Complaint   Patient presents with    Follow-up       Return in about 1 year (around 12/14/2024) for Office Visit. Oncology History   Personal history of malignant neoplasm of thyroid   5/23/2023 Surgery    A. Thyroid and lymph node (1); total thyroidectomy:       - Papillary thyroid carcinoma, infiltrative follicular variant, 2.03 cm, see note       - Carcinoma involves right thyroid lobe       - Angioinvasion: Not identified       - Lymphatic invasion: Not identified       - All margins negative for carcinoma       - One lymph node, negative for malignancy (0/1)      - Background thyroid with follicular nodular disease and previous biopsy site changes       - See synoptic report     6/8/2023 Initial Diagnosis    Thyroid cancer ([address])     6/14/2023 -  Cancer Staged    Staging form: Thyroid - Differentiated and Anaplastic, AJCC 8th Edition  - Pathologic: pT1a, pN0, cM0 - Signed by Roberta Hays MD on 6/14/2023  Lymph node metastasis: Absent           History of Present Illness  :   80 yo female with new diagnosis of 2.5 cm isthmus nodule of thyroid now s/p total thyroid 5/2023 with 0.13 cm infiltrative follicular variant papillary thyroid cancer. The patient is doing very well with no neck pain, nausea vomiting, trouble swallowing, pain with swallowing. She sees endo regularly and just saw them 11/2023 with overall stable findings.  She will be seeing them with H&N LN US in 6 mo. Review of Systems  Complete ROS Surg Onc:   Constitutional: The patient denies new or recent history of general fatigue, no recent weight loss, no change in appetite. Eyes: No complaints of visual problems, no scleral icterus. ENT: No complaints of ear pain, no hoarseness, no difficulty swallowing,  no tinnitus and no new masses in head, oral cavity, or neck. Cardiovascular: No complaints of chest pain, no palpitations, no ankle edema. Respiratory: No complaints of shortness of breath, no cough. Gastrointestinal: No complaints of jaundice, no bloody stools, no pale stools. Genitourinary: No complaints of dysuria, no hematuria, no nocturia, no frequent urination, no urethral discharge. Musculoskeletal: No complaints of weakness, paralysis, joint stiffness or arthralgias. Integumentary: No complaints of rash, no new lesions. Neurological: No complaints of convulsions, no seizures, no dizziness. Hematologic/Lymphatic: No complaints of easy bruising. Endocrine:  No hot or cold intolerance. No polydipsia, polyphagia, or polyuria. Allergy/immunology:  No environmental allergies. No food allergies. Not immunocompromised.       Patient Active Problem List   Diagnosis    Diabetes mellitus ([address] )    Hyperlipidemia    Hypertension    History of hysterectomy    BRCA negative - Negative [facility] 1/2018    Family history of ovarian cancer - mother in 62s    Family history of colon cancer - brother in 45s    History of right ovarian cyst - stable    Encounter for gynecological examination (general) (routine) without abnormal findings    Thyroid nodule    Personal history of malignant neoplasm of thyroid    Encounter for follow-up surveillance of thyroid cancer     Past Medical History:   Diagnosis Date    Diabetes mellitus ([address])     Type II    Hyperlipidemia     Hypertension      Past Surgical History:   Procedure Laterality Date    COLONOSCOPY  06/2021    Due 2026    HYSTERECTOMY RICKY    LARYNGOSCOPY N/A 5/23/2023    Procedure: PRE-INDUCTION AWAKE NASAL FIBEROPTIC LARYNGOSCOPY;  Surgeon: Nadine Foley MD;  [facility];  Service: Surgical Oncology    MAMMO (HISTORICAL) Bilateral 12/13/2021    [facility]    THYROIDECTOMY N/A 5/23/2023    Procedure: TOTAL THYROIDECTOMY;  Surgeon: Nadine Foley MD;  [facility];  Service: Surgical Oncology    US GUIDED THYROID BIOPSY  4/4/2023     Family History   Problem Relation Age of Onset    Diabetes Mother     Ovarian cancer Mother 61    Kidney failure Father     Heart disease Sister     No Known Problems Sister     No Known Problems Sister     No Known Problems Sister     No Known Problems Sister     Diabetes Maternal Grandmother     Hypertension Maternal Grandmother     Diabetes Maternal Grandfather     Hypertension Maternal Grandfather     No Known Problems Paternal Grandmother     No Known Problems Paternal Grandfather     Colon cancer Brother 39    No Known Problems Brother     No Known Problems Brother     No Known Problems Brother     No Known Problems Son     No Known Problems Son     No Known Problems Maternal Aunt     No Known Problems Maternal Aunt     No Known Problems Maternal Aunt     No Known Problems Maternal Aunt     No Known Problems Maternal Aunt     No Known Problems Maternal Aunt     No Known Problems Paternal Aunt     No Known Problems Paternal Aunt     Breast cancer Neg Hx     Uterine cancer Neg Hx      Social History     Socioeconomic History    Marital status: /Civil Union     Spouse name: Not on file    Number of children: Not on file    Years of education: Not on file    Highest education level: Not on file   Occupational History    Not on file   Tobacco Use    Smoking status: Never    Smokeless tobacco: Never   Vaping Use    Vaping status: Never Used   Substance and Sexual Activity    Alcohol use: Not Currently    Drug use: Never    Sexual activity: Yes     Partners: Male     Birth control/protection: Surgical     Comment: no new partner in past 42 years   Other Topics Concern    Not on file   Social History Narrative    Exercise: 2-3 times a week    Domestic violence: No     Social Determinants of Health     Financial Resource Strain: Not on file   Food Insecurity: Not on file   Transportation Needs: Not on file   Physical Activity: Not on file   Stress: Not on file   Social Connections: Not on file   Intimate Partner Violence: Not on file   Housing Stability: Not on file       Current Outpatient Medications:     cyanocobalamin 1,000 mcg/mL, INJECT 1000 MCG INTRAMUSCULARLY MONTHLY, Disp: , Rfl:     levothyroxine (Euthyrox) 112 mcg tablet, Take 1 tablet (112 mcg total) by mouth daily, Disp: 90 tablet, Rfl: 1    losartan (COZAAR) 50 mg tablet, , Disp: , Rfl:     metFORMIN (GLUCOPHAGE-XR) 750 mg 24 hr tablet, every 24 hours, Disp: , Rfl:     omega-3-acid ethyl esters (LOVAZA) 1 g capsule, Every 12 hours, Disp: , Rfl:     Rosuvastatin Calcium 10 MG CPSP, every 24 hours, Disp: , Rfl:     Semaglutide,0.25 or 0.5MG/DOS, (Ozempic, 0.25 or 0.5 MG/DOSE,) 2 MG/1.5ML SOPN, 0.5 mg every 7 days Saturday, Disp: , Rfl:     acetaminophen (TYLENOL) 325 mg tablet, Take 2 tablets (650 mg total) by mouth every 6 (six) hours as needed for mild pain, headaches or fever (Patient not taking: Reported on 11/30/2023), Disp: , Rfl: 0    losartan (COZAAR) 25 mg tablet, every 24 hours (Patient not taking: Reported on 6/14/2023), Disp: , Rfl:     Ozempic, 0.25 or 0.5 MG/DOSE, 2 MG/3ML injection pen, , Disp: , Rfl:     traMADol (Ultram) 50 mg tablet, Take 1 tablet (50 mg total) by mouth every 6 (six) hours as needed for moderate pain (Patient not taking: Reported on 6/14/2023), Disp: 10 tablet, Rfl: 0  No Known Allergies    Vitals:    12/14/23 1014   BP: 120/70   Pulse: 75   Temp: 98 °F (36.7 °C)   SpO2: 99%         Physical Exam   General: Appears well, appears stated age  Skin: Warm, anicteric  HEENT: Normocephalic, atraumatic; sclera aniceteric, mucous membranes moist; cervical nodes without adenopathy. Neck incision healing well  Cardiopulmonary: RRR, Easy WOB, no BLE edema  Abd: Flat and soft, nontender, no masses appreciated, no hepatosplenomegaly  MSK: Symmetric, no cyanosis, no overt weakness  Lymphatic: No cervical, axillary or inguinal lymphadenopathy  Neuro: Affect appropriate, no gross motor abnormalities      Pathology:  Final Diagnosis   A. & B. Thyroid, Isthmus: (Thin-Prep and smears)  Follicular neoplasm/Suspicious for follicular neoplasm ([place] Category IV) - See note. Cellular specimen. Atypical follicular cells with crowding, overlapping, occasional microfollicular patterns, increased nuclear size, prominent nucleoli, and occasional nuclear grooves. Mixed inflammatory cells and colloid. Satisfactory for evaluation. Labs: Reviewed in [address] [place] [address] guided thyroid biopsy with [facility]    Result Date: 4/4/2023  Narrative: ULTRASOUND-GUIDED THYROID BIOPSY HISTORY: 74year-old female with a history of thyroid nodules. COMPARISON: Thyroid ultrasound on 1/26/2023. FINDINGS: Prior ultrasound images were reviewed. On prior thyroid ultrasound from 1/26/2023, there was a left isthmus thyroid nodule measuring 2.5 x 1.3 x 3.0 cm (TI-RADS 3) that met criteria for FNA. .The patient presented today with a prescription to undergo FNA of the left isthmus thyroid nodule with molecular testing. On today's limited thyroid ultrasound, the left isthmus thyroid nodule measured 2.4 x 1.3 x 3.1 cm. The procedure was explained to the patient, including risks of hemorrhage, infection and local injury. Informed consent was freely obtained. The patient verbalized expressed understanding of the above risks and wished to proceed with the procedure. Final standard "time-out" procedure performed. PROCEDURE: The neck was prepped and draped in normal sterile fashion.  Under real-time ultrasound guidance and local anesthesia 5 passes with a 25 gauge needle were made through the left isthmus thyroid nodule. Cytopathology was present and deemed the specimens adequate for evaluation. German Machado 3 specimens were sent to cytology and 2 were reserved for potential molecular testing. The patient tolerated the procedure well. Postprocedure instructions were provided for the patient. I asked the patient to call us with any questions, concerns, or acute problems. The patient expressed understanding of the above. Impression: Status post successful ultrasound-guided thyroid biopsy with molecular testing of the left isthmus thyroid nodule. I reviewed the above findings and procedure with Dr. Jose A Zhao. PERFORMED, DICTATED AND SIGNED BY: Tamanna Berumen PA-C Workstation performed: [id number]       I independently reviewed and interpreted the above laboratory and imaging data, incl US, path, afirma, endo notes, labs      Discussion/Summary:   T1 well-diff thyroid cancer, infiltrative follicular variant. The patient is doing very well. She will cont to see endocrine who is ordering her surveillance labs and imaging - see them in 6 mo. I will see her back in one year for surveillance with exam. All questions answered.

## 2023-12-14 NOTE — TELEPHONE ENCOUNTER
LM for patient that Dr. Tea Banerjee would like to see her in 1 yr to followup. Left number for Oncology [place] to schedule.

## 2024-01-09 ENCOUNTER — APPOINTMENT (OUTPATIENT)
Dept: LAB | Facility: CLINIC | Age: 69
End: 2024-01-09
Payer: COMMERCIAL

## 2024-01-09 ENCOUNTER — HOSPITAL ENCOUNTER (OUTPATIENT)
Dept: ULTRASOUND IMAGING | Facility: CLINIC | Age: 69
Discharge: HOME | End: 2024-01-09
Payer: COMMERCIAL

## 2024-01-09 DIAGNOSIS — Z80.41 FAMILY HISTORY OF MALIGNANT NEOPLASM OF OVARY: ICD-10-CM

## 2024-01-09 DIAGNOSIS — Z87.42 HISTORY OF OVARIAN CYST: ICD-10-CM

## 2024-01-09 DIAGNOSIS — D39.11 NEOPLASM OF UNCERTAIN BEHAVIOR OF RIGHT OVARY: ICD-10-CM

## 2024-01-09 LAB — CANCER AG125 SERPL-ACNC: 12.4 U/ML (ref 0–35)

## 2024-01-09 PROCEDURE — 86304 IMMUNOASSAY TUMOR CA 125: CPT

## 2024-01-09 PROCEDURE — 76830 TRANSVAGINAL US NON-OB: CPT

## 2024-01-09 PROCEDURE — 76856 US EXAM PELVIC COMPLETE: CPT

## 2024-01-09 PROCEDURE — 36415 COLL VENOUS BLD VENIPUNCTURE: CPT

## 2024-01-20 NOTE — PROGRESS NOTES
Assessment/Plan:    History of right ovarian cyst - stable  Asymptomatic, normal pelvic exam.   Last u/s 1/9/24 R ov 3.3 cm with 1.5 cm stable simple cyst. L ov not visualized, no masses or fluid.    1/9/24 normal  Will repeat imaging and exam in one year.   Discussed ca 125 testing, not a screen for ovarian cancer and recommend discontinuing annual testing, ordering only prn.    Family history of ovarian cancer - mother in 60s  Here for annual check due to family history and personal h/o ovarian cyst.   No breast or gyn concerns.   Had thyroidectomy last year after nodule appreciated on exam, early cancer.   Normal breast and pelvic exams s/p hysterectomy  Mammo order given, last 12/14/23  Colonoscopy 2021, due 2026  Dexa 10/2022 WNL; due 5-7 years       Diagnoses and all orders for this visit:    Family history of ovarian cancer - mother in 60s    Family history of colon cancer - brother in 40s    History of right ovarian cyst - stable    History of hysterectomy    BRCA negative - Negative [employer] 1/2018    Encounter for screening mammogram for malignant neoplasm of breast  -     Mammo screening bilateral w 3d & cad; Future          Subjective:      Patient ID: Lisseth Gil is a 68 y.o. female.    HPI Here for annual gyn and breast exam.    The following portions of the patient's history were reviewed and updated as appropriate: She  has a past medical history of Diabetes mellitus (HCC), History of screening mammography (12/14/2023), Hyperlipidemia, and Hypertension.  She   Patient Active Problem List    Diagnosis Date Noted    Encounter for follow-up surveillance of thyroid cancer 12/11/2023    Personal history of malignant neoplasm of thyroid 06/08/2023    Encounter for gynecological examination (general) (routine) without abnormal findings 12/21/2021    History of hysterectomy 12/17/2021    BRCA negative - Negative [employer] 1/2018 12/17/2021    Family history of ovarian cancer - mother in 60s  12/17/2021    Family history of colon cancer - brother in 40s 12/17/2021    History of right ovarian cyst - stable 12/17/2021    Diabetes mellitus (HCC)     Hyperlipidemia     Hypertension      She  has a past surgical history that includes Colonoscopy (06/2021); Hysterectomy; Mammo (historical) (Bilateral, 12/13/2021); US guided thyroid biopsy (4/4/2023); Thyroidectomy (N/A, 5/23/2023); and LARYNGOSCOPY (N/A, 5/23/2023).  Her family history includes Colon cancer (age of onset: 45) in her brother; Diabetes in her maternal grandfather, maternal grandmother, and mother; Heart disease in her sister; Hypertension in her maternal grandfather and maternal grandmother; Kidney failure in her father; No Known Problems in her brother, brother, brother, maternal aunt, maternal aunt, maternal aunt, maternal aunt, maternal aunt, maternal aunt, paternal aunt, paternal aunt, paternal grandfather, paternal grandmother, sister, sister, sister, sister, son, and son; Ovarian cancer (age of onset: 60) in her mother.  She  reports that she has never smoked. She has never used smokeless tobacco. She reports that she does not currently use alcohol. She reports that she does not use drugs.  Current Outpatient Medications   Medication Sig Dispense Refill    cyanocobalamin 1,000 mcg/mL INJECT 1000 MCG INTRAMUSCULARLY MONTHLY      levothyroxine (Euthyrox) 112 mcg tablet Take 1 tablet (112 mcg total) by mouth daily 90 tablet 1    losartan (COZAAR) 50 mg tablet       metFORMIN (GLUCOPHAGE-XR) 750 mg 24 hr tablet every 24 hours      omega-3-acid ethyl esters (LOVAZA) 1 g capsule Every 12 hours      Rosuvastatin Calcium 10 MG CPSP every 24 hours      Semaglutide,0.25 or 0.5MG/DOS, (Ozempic, 0.25 or 0.5 MG/DOSE,) 2 MG/1.5ML SOPN 0.5 mg every 7 days Saturday       No current facility-administered medications for this visit.     She has No Known Allergies..    Review of Systems  No breast, bladder, bowel changes. No new persistent pain, bloating,  "early satiety or pelvic pressure      Objective:      /80   Ht 5' 0.5\" (1.537 m)   Wt 55.5 kg (122 lb 6.4 oz)   Breastfeeding No   BMI 23.51 kg/m²          Physical Exam    General appearance: no distress, pleasant  Neck: well healed scar, s/p thyroidectomy, no palpable adenopathy  Lymph nodes: no palpable adenopathy  Breasts: no masses, nodes or skin changes  Abdomen: soft, non tender, no palpable masses  Pelvic exam: normal atrophic external genitalia, stable right 3 cm bartholins, urethral meatus normal, vagina atrophic without lesions, cuff intact, no adnexal masses, non tender  Rectal exam: normal sphincter tone, no masses, RV confirms above    "

## 2024-01-23 ENCOUNTER — OFFICE VISIT (OUTPATIENT)
Dept: OBGYN CLINIC | Facility: CLINIC | Age: 69
End: 2024-01-23
Payer: COMMERCIAL

## 2024-01-23 VITALS
SYSTOLIC BLOOD PRESSURE: 138 MMHG | WEIGHT: 122.4 LBS | DIASTOLIC BLOOD PRESSURE: 80 MMHG | BODY MASS INDEX: 23.11 KG/M2 | HEIGHT: 61 IN

## 2024-01-23 DIAGNOSIS — Z90.710 HISTORY OF HYSTERECTOMY: ICD-10-CM

## 2024-01-23 DIAGNOSIS — Z87.42 HISTORY OF OVARIAN CYST: ICD-10-CM

## 2024-01-23 DIAGNOSIS — Z12.31 ENCOUNTER FOR SCREENING MAMMOGRAM FOR MALIGNANT NEOPLASM OF BREAST: ICD-10-CM

## 2024-01-23 DIAGNOSIS — Z13.71 BRCA NEGATIVE: ICD-10-CM

## 2024-01-23 DIAGNOSIS — Z80.0 FAMILY HISTORY OF COLON CANCER: ICD-10-CM

## 2024-01-23 DIAGNOSIS — Z80.41 FAMILY HISTORY OF OVARIAN CANCER: Primary | ICD-10-CM

## 2024-01-23 PROBLEM — E04.1 THYROID NODULE: Status: RESOLVED | Noted: 2023-05-01 | Resolved: 2024-01-23

## 2024-01-23 PROCEDURE — 99213 OFFICE O/P EST LOW 20 MIN: CPT | Performed by: OBSTETRICS & GYNECOLOGY

## 2024-01-23 NOTE — ASSESSMENT & PLAN NOTE
Here for annual check due to family history and personal h/o ovarian cyst.   No breast or gyn concerns.   Had thyroidectomy last year after nodule appreciated on exam, early cancer.   Normal breast and pelvic exams s/p hysterectomy  Mammo order given, last 12/14/23  Colonoscopy 2021, due 2026  Dexa 10/2022 WNL; due 5-7 years

## 2024-01-23 NOTE — LETTER
January 23, 2024     Emeli Jha PA-C  [address] [place] [id number]    Patient: Lisseth Gil   YOB: 1955   Date of Visit: 1/23/2024       Dear Emeli:    Lisseth Gil was in to see me today for evaluation. Below are my notes for this consultation.    If you have questions, please do not hesitate to call me. I look forward to following your patient along with you.         Sincerely,        Kizzy Ritchie MD        CC: No Recipients    Kizzy Ritchie MD  1/23/2024 12:06 PM  Sign when Signing Visit  Assessment/Plan:    History of right ovarian cyst - stable  Asymptomatic, normal pelvic exam.   Last u/s 1/9/24 R ov 3.3 cm with 1.5 cm stable simple cyst. L ov not visualized, no masses or fluid.    1/9/24 normal  Will repeat imaging and exam in one year.   Discussed ca 125 testing, not a screen for ovarian cancer and recommend discontinuing annual testing, ordering only prn.    Family history of ovarian cancer - mother in 60s  Here for annual check due to family history and personal h/o ovarian cyst.   No breast or gyn concerns.   Had thyroidectomy last year after nodule appreciated on exam, early cancer.   Normal breast and pelvic exams s/p hysterectomy  Mammo order given, last 12/14/23  Colonoscopy 2021, due 2026  Dexa 10/2022 WNL; due 5-7 years       Diagnoses and all orders for this visit:    Family history of ovarian cancer - mother in 60s    Family history of colon cancer - brother in 40s    History of right ovarian cyst - stable    History of hysterectomy    BRCA negative - Negative [employer] 1/2018    Encounter for screening mammogram for malignant neoplasm of breast  -     Mammo screening bilateral w 3d & cad; Future          Subjective:      Patient ID: Lisseth Gil is a 68 y.o. female.    HPI Here for annual gyn and breast exam.    The following portions of the patient's history were reviewed and updated as appropriate: She  has a past medical history of Diabetes mellitus (HCC), History of  screening mammography (12/14/2023), Hyperlipidemia, and Hypertension.  She   Patient Active Problem List    Diagnosis Date Noted   • Encounter for follow-up surveillance of thyroid cancer 12/11/2023   • Personal history of malignant neoplasm of thyroid 06/08/2023   • Encounter for gynecological examination (general) (routine) without abnormal findings 12/21/2021   • History of hysterectomy 12/17/2021   • BRCA negative - Negative [facility] 1/2018 12/17/2021   • Family history of ovarian cancer - mother in 60s 12/17/2021   • Family history of colon cancer - brother in 40s 12/17/2021   • History of right ovarian cyst - stable 12/17/2021   • Diabetes mellitus (HCC)    • Hyperlipidemia    • Hypertension      She  has a past surgical history that includes Colonoscopy (06/2021); Hysterectomy; Mammo (historical) (Bilateral, 12/13/2021); US guided thyroid biopsy (4/4/2023); Thyroidectomy (N/A, 5/23/2023); and LARYNGOSCOPY (N/A, 5/23/2023).  Her family history includes Colon cancer (age of onset: 45) in her brother; Diabetes in her maternal grandfather, maternal grandmother, and mother; Heart disease in her sister; Hypertension in her maternal grandfather and maternal grandmother; Kidney failure in her father; No Known Problems in her brother, brother, brother, maternal aunt, maternal aunt, maternal aunt, maternal aunt, maternal aunt, maternal aunt, paternal aunt, paternal aunt, paternal grandfather, paternal grandmother, sister, sister, sister, sister, son, and son; Ovarian cancer (age of onset: 60) in her mother.  She  reports that she has never smoked. She has never used smokeless tobacco. She reports that she does not currently use alcohol. She reports that she does not use drugs.  Current Outpatient Medications   Medication Sig Dispense Refill   • cyanocobalamin 1,000 mcg/mL INJECT 1000 MCG INTRAMUSCULARLY MONTHLY     • levothyroxine (Euthyrox) 112 mcg tablet Take 1 tablet (112 mcg total) by mouth daily 90 tablet 1  "  • losartan (COZAAR) 50 mg tablet      • metFORMIN (GLUCOPHAGE-XR) 750 mg 24 hr tablet every 24 hours     • omega-3-acid ethyl esters (LOVAZA) 1 g capsule Every 12 hours     • Rosuvastatin Calcium 10 MG CPSP every 24 hours     • Semaglutide,0.25 or 0.5MG/DOS, (Ozempic, 0.25 or 0.5 MG/DOSE,) 2 MG/1.5ML SOPN 0.5 mg every 7 days Saturday       No current facility-administered medications for this visit.     She has No Known Allergies..    Review of Systems  No breast, bladder, bowel changes. No new persistent pain, bloating, early satiety or pelvic pressure      Objective:      /80   Ht 5' 0.5\" (1.537 m)   Wt 55.5 kg (122 lb 6.4 oz)   Breastfeeding No   BMI 23.51 kg/m²          Physical Exam    General appearance: no distress, pleasant  Neck: well healed scar, s/p thyroidectomy, no palpable adenopathy  Lymph nodes: no palpable adenopathy  Breasts: no masses, nodes or skin changes  Abdomen: soft, non tender, no palpable masses  Pelvic exam: normal atrophic external genitalia, stable right 3 cm bartholins, urethral meatus normal, vagina atrophic without lesions, cuff intact, no adnexal masses, non tender  Rectal exam: normal sphincter tone, no masses, RV confirms above    "

## 2024-01-23 NOTE — PATIENT INSTRUCTIONS
Return to office in one year unless having any problems such as breast changes, bleeding, new persistent pain, new progressive bloating, new problems eating (getting full to quickly) or new constant urinary pressure that does not resolve in one week.    Call in six months to schedule your annual visit.    Continue to strive for 1200 mg of calcium and 1000 IU of vitamin D daily in diet and supplements combined for your bone health.  You can only absorb 600 mg of calcium at a time. Avoid excess calcium as this may adversely effect your heart.  There are 400 mg of calcium in an 8 oz serving of dairy.    Your last colonoscopy was in 2021 and is due in 2026.   Your last dexa with 10/2022 and was normal, we would repeat it in 2027.

## 2024-01-23 NOTE — ASSESSMENT & PLAN NOTE
Asymptomatic, normal pelvic exam.   Last u/s 1/9/24 R ov 3.3 cm with 1.5 cm stable simple cyst. L ov not visualized, no masses or fluid.    1/9/24 normal  Will repeat imaging and exam in one year.   Discussed ca 125 testing, not a screen for ovarian cancer and recommend discontinuing annual testing, ordering only prn.

## 2024-02-21 PROBLEM — Z01.419 ENCOUNTER FOR GYNECOLOGICAL EXAMINATION (GENERAL) (ROUTINE) WITHOUT ABNORMAL FINDINGS: Status: RESOLVED | Noted: 2021-12-21 | Resolved: 2024-02-21

## 2024-07-10 DIAGNOSIS — E89.0 POST-OPERATIVE HYPOTHYROIDISM: ICD-10-CM

## 2024-07-10 RX ORDER — LEVOTHYROXINE SODIUM 112 UG/1
112 TABLET ORAL DAILY
Qty: 90 TABLET | Refills: 1 | Status: SHIPPED | OUTPATIENT
Start: 2024-07-10

## 2024-12-12 ENCOUNTER — TELEPHONE (OUTPATIENT)
Age: 69
End: 2024-12-12

## 2024-12-12 DIAGNOSIS — D39.11 NEOPLASM OF UNCERTAIN BEHAVIOR OF RIGHT OVARY: ICD-10-CM

## 2024-12-12 DIAGNOSIS — Z87.42 HISTORY OF OVARIAN CYST: ICD-10-CM

## 2024-12-12 DIAGNOSIS — Z80.41 FAMILY HISTORY OF OVARIAN CANCER: Primary | ICD-10-CM

## 2024-12-12 NOTE — TELEPHONE ENCOUNTER
Orders placed. Please inform, can print ultrasound order from portal or if need be please send.   Thanks.

## 2024-12-12 NOTE — TELEPHONE ENCOUNTER
Pt scheduled annual 02/14 with Dr Ritcihe. Pt would like US pelvis order along with her blood work . Pt made aware of message sent.

## 2024-12-29 DIAGNOSIS — E89.0 POST-OPERATIVE HYPOTHYROIDISM: ICD-10-CM

## 2024-12-31 ENCOUNTER — OFFICE VISIT (OUTPATIENT)
Dept: ENDOCRINOLOGY | Facility: CLINIC | Age: 69
End: 2024-12-31
Payer: COMMERCIAL

## 2024-12-31 VITALS
WEIGHT: 129.6 LBS | BODY MASS INDEX: 24.47 KG/M2 | DIASTOLIC BLOOD PRESSURE: 78 MMHG | SYSTOLIC BLOOD PRESSURE: 128 MMHG | HEIGHT: 61 IN

## 2024-12-31 DIAGNOSIS — E11.69 TYPE 2 DIABETES MELLITUS WITH OTHER SPECIFIED COMPLICATION, WITHOUT LONG-TERM CURRENT USE OF INSULIN (HCC): ICD-10-CM

## 2024-12-31 DIAGNOSIS — E89.0 POST-OPERATIVE HYPOTHYROIDISM: ICD-10-CM

## 2024-12-31 DIAGNOSIS — C73 PAPILLARY CARCINOMA, FOLLICULAR VARIANT (HCC): Primary | ICD-10-CM

## 2024-12-31 PROCEDURE — G2211 COMPLEX E/M VISIT ADD ON: HCPCS | Performed by: INTERNAL MEDICINE

## 2024-12-31 PROCEDURE — 99214 OFFICE O/P EST MOD 30 MIN: CPT | Performed by: INTERNAL MEDICINE

## 2024-12-31 RX ORDER — LEVOTHYROXINE SODIUM 112 UG/1
112 TABLET ORAL DAILY
Qty: 90 TABLET | Refills: 0 | OUTPATIENT
Start: 2024-12-31

## 2024-12-31 RX ORDER — LEVOTHYROXINE SODIUM 112 UG/1
112 TABLET ORAL DAILY
Qty: 90 TABLET | Refills: 1 | Status: SHIPPED | OUTPATIENT
Start: 2024-12-31

## 2024-12-31 NOTE — PROGRESS NOTES
12/31/2024    Assessment & Plan      Diagnoses and all orders for this visit:    Papillary carcinoma, follicular variant (HCC)  -     Thyroglobulin  -     Anti-thyroglobulin antibody  -     US head neck lymph node mapping; Future    Post-operative hypothyroidism  -     levothyroxine 112 mcg tablet; Take 1 tablet (112 mcg total) by mouth daily  -     TSH, 3rd generation  -     T4, free    Type 2 diabetes mellitus with other specified complication, without long-term current use of insulin (HCC)        Assessment/Plan:  Thyroid cancer: This is small and low risk.  Will update labs and ultrasound as ordered above.  Will contact patient as results are available.  Hypothyroidism: Goal TSH 0.5-2.  Update labs.  Will contact patient as results are available.  3. DM2: Managed by PCP.    CC: Follow-up    History of Present Illness     HPI: Lisseth Gil is a 69 y.o. year old female with history of thyroid cancer and hypothyroidism who presents for a follow-up appointment.  In the past she underwent thyroidectomy in May 2023 with surgical pathology consistent with 0.13 cm infiltrative follicular variant papillary thyroid cancer involving the right thyroid lobe with no angioinvasion, lymphatic invasion or extrathyroidal extension. There was 1 lymph node sampled which was negative for malignancy. Staging was [id number]. Patient did not receive radioactive iodine.  She presents today overall feeling well.  No symptoms of hypothyroidism or hyperthyroidism which we reviewed.  No neck compressive symptoms.    Review of Systems   All other systems reviewed and are negative.      Historical Information   Past Medical History:   Diagnosis Date    Diabetes mellitus (HCC)     Type II    History of screening mammography 12/14/2023    Bi-Rads 1.    Hyperlipidemia     Hypertension      Past Surgical History:   Procedure Laterality Date    COLONOSCOPY  06/2021    Due 2026    HYSTERECTOMY      [facility]    LARYNGOSCOPY N/A 5/23/2023    Procedure:  PRE-INDUCTION AWAKE NASAL FIBEROPTIC LARYNGOSCOPY;  Surgeon: Domi Nur MD;  [facility]: [facility];  Service: Surgical Oncology    MAMMO (HISTORICAL) Bilateral 12/13/2021    [facility]    THYROIDECTOMY N/A 5/23/2023    Procedure: TOTAL THYROIDECTOMY;  Surgeon: Domi Nur MD;  Location: [facility];  Service: Surgical Oncology    US GUIDED THYROID BIOPSY  4/4/2023     Social History   Social History     Substance and Sexual Activity   Alcohol Use Not Currently     Social History     Substance and Sexual Activity   Drug Use Never     Social History     Tobacco Use   Smoking Status Never   Smokeless Tobacco Never     Family History:   Family History   Problem Relation Age of Onset    Diabetes Mother     Ovarian cancer Mother 60    Kidney failure Father     Heart disease Sister     No Known Problems Sister     No Known Problems Sister     No Known Problems Sister     No Known Problems Sister     Diabetes Maternal Grandmother     Hypertension Maternal Grandmother     Diabetes Maternal Grandfather     Hypertension Maternal Grandfather     No Known Problems Paternal Grandmother     No Known Problems Paternal Grandfather     Colon cancer Brother 45    No Known Problems Brother     No Known Problems Brother     No Known Problems Brother     No Known Problems Son     No Known Problems Son     No Known Problems Maternal Aunt     No Known Problems Maternal Aunt     No Known Problems Maternal Aunt     No Known Problems Maternal Aunt     No Known Problems Maternal Aunt     No Known Problems Maternal Aunt     No Known Problems Paternal Aunt     No Known Problems Paternal Aunt     Breast cancer Neg Hx     Uterine cancer Neg Hx        Meds/Allergies   Current Outpatient Medications   Medication Sig Dispense Refill    cyanocobalamin 1,000 mcg/mL INJECT 1000 MCG INTRAMUSCULARLY MONTHLY      levothyroxine 112 mcg tablet Take 1 tablet (112 mcg total) by mouth daily 90 tablet 1    losartan (COZAAR) 50 mg tablet       metFORMIN  "(GLUCOPHAGE-XR) 750 mg 24 hr tablet every 24 hours      omega-3-acid ethyl esters (LOVAZA) 1 g capsule Every 12 hours      Rosuvastatin Calcium 10 MG CPSP every 24 hours      Semaglutide,0.25 or 0.5MG/DOS, (Ozempic, 0.25 or 0.5 MG/DOSE,) 2 MG/1.5ML SOPN 0.5 mg every 7 days Saturday       No current facility-administered medications for this visit.     No Known Allergies    Objective   Vitals: Blood pressure 128/78, height 5' 1\" (1.549 m), weight 58.8 kg (129 lb 9.6 oz), not currently breastfeeding.  Invasive Devices       None                   Physical Exam  Vitals reviewed.   Constitutional:       General: She is not in acute distress.     Appearance: She is well-developed. She is not diaphoretic.   HENT:      Head: Normocephalic and atraumatic.   Eyes:      Conjunctiva/sclera: Conjunctivae normal.      Pupils: Pupils are equal, round, and reactive to light.   Neck:      Thyroid: No thyromegaly.   Cardiovascular:      Rate and Rhythm: Normal rate and regular rhythm.   Pulmonary:      Effort: Pulmonary effort is normal.      Breath sounds: Normal breath sounds.   Abdominal:      General: Bowel sounds are normal.      Palpations: Abdomen is soft.   Musculoskeletal:         General: Normal range of motion.      Cervical back: Normal range of motion and neck supple.   Lymphadenopathy:      Cervical: No cervical adenopathy.   Skin:     General: Skin is warm and dry.      Findings: No rash.   Neurological:      Mental Status: She is alert and oriented to person, place, and time.      Motor: No abnormal muscle tone.   Psychiatric:         Behavior: Behavior normal.         The history was obtained from the review of the chart and from the patient.    Lab Results:      Component      Latest Ref Rng 11/27/2023   TSH 3RD GENERATON      0.450 - 4.500 uIU/mL 0.392 (L)    FREE T4      0.61 - 1.12 ng/dL 1.29 (H)       Legend:  (L) Low  (H) High    Future Appointments   Date Time Provider Department Center   1/29/2025  8:30 AM " "UB MAMMO UP 1 UB UP Mammo UB UPPER PER   1/30/2025 11:00 AM UB US UP 1 UB UP US UB UPPER PER   2/14/2025  3:00 PM Kizzy Ritchie MD [facility]       Portions of the record may have been created with voice recognition software. Occasional wrong word or \"sound a like\" substitutions may have occurred due to the inherent limitations of voice recognition software. Read the chart carefully and recognize, using context, where substitutions have occurred.    "

## 2025-01-29 ENCOUNTER — HOSPITAL ENCOUNTER (OUTPATIENT)
Dept: MAMMOGRAPHY | Facility: CLINIC | Age: 70
Discharge: HOME | End: 2025-01-29
Payer: COMMERCIAL

## 2025-01-29 ENCOUNTER — APPOINTMENT (OUTPATIENT)
Dept: LAB | Facility: CLINIC | Age: 70
End: 2025-01-29
Payer: COMMERCIAL

## 2025-01-29 VITALS — WEIGHT: 129 LBS | BODY MASS INDEX: 24.35 KG/M2 | HEIGHT: 61 IN

## 2025-01-29 DIAGNOSIS — Z87.42 PERSONAL HISTORY OF REPRODUCTIVE AND OBSTETRICAL PROBLEMS: ICD-10-CM

## 2025-01-29 DIAGNOSIS — Z12.31 ENCOUNTER FOR SCREENING MAMMOGRAM FOR MALIGNANT NEOPLASM OF BREAST: ICD-10-CM

## 2025-01-29 DIAGNOSIS — D39.10 NEOPLASM OF UNCERTAIN BEHAVIOR OF OVARY, UNSPECIFIED LATERALITY: ICD-10-CM

## 2025-01-29 DIAGNOSIS — Z80.41 FAMILY HISTORY OF MALIGNANT NEOPLASM OF OVARY: ICD-10-CM

## 2025-01-29 DIAGNOSIS — C73 MALIGNANT NEOPLASM OF THYROID GLAND (HCC): ICD-10-CM

## 2025-01-29 DIAGNOSIS — E89.0 POSTSURGICAL HYPOTHYROIDISM: ICD-10-CM

## 2025-01-29 LAB
CANCER AG125 SERPL-ACNC: 10.4 U/ML (ref 0–35)
T4 FREE SERPL-MCNC: 1.19 NG/DL (ref 0.61–1.12)
TSH SERPL DL<=0.05 MIU/L-ACNC: 1.34 UIU/ML (ref 0.45–4.5)

## 2025-01-29 PROCEDURE — 84432 ASSAY OF THYROGLOBULIN: CPT

## 2025-01-29 PROCEDURE — 84443 ASSAY THYROID STIM HORMONE: CPT

## 2025-01-29 PROCEDURE — 86304 IMMUNOASSAY TUMOR CA 125: CPT

## 2025-01-29 PROCEDURE — 77063 BREAST TOMOSYNTHESIS BI: CPT

## 2025-01-29 PROCEDURE — 36415 COLL VENOUS BLD VENIPUNCTURE: CPT

## 2025-01-29 PROCEDURE — 77067 SCR MAMMO BI INCL CAD: CPT

## 2025-01-29 PROCEDURE — 84439 ASSAY OF FREE THYROXINE: CPT

## 2025-01-29 PROCEDURE — 86800 THYROGLOBULIN ANTIBODY: CPT

## 2025-01-30 ENCOUNTER — RESULTS FOLLOW-UP (OUTPATIENT)
Dept: OTHER | Facility: HOSPITAL | Age: 70
End: 2025-01-30

## 2025-01-30 ENCOUNTER — HOSPITAL ENCOUNTER (OUTPATIENT)
Dept: ULTRASOUND IMAGING | Facility: CLINIC | Age: 70
Discharge: HOME | End: 2025-01-30
Payer: COMMERCIAL

## 2025-01-30 ENCOUNTER — RESULTS FOLLOW-UP (OUTPATIENT)
Dept: ENDOCRINOLOGY | Facility: CLINIC | Age: 70
End: 2025-01-30

## 2025-01-30 DIAGNOSIS — Z87.42 HISTORY OF OVARIAN CYST: ICD-10-CM

## 2025-01-30 DIAGNOSIS — E89.0 POST-OPERATIVE HYPOTHYROIDISM: Primary | ICD-10-CM

## 2025-01-30 DIAGNOSIS — Z80.41 FAMILY HISTORY OF OVARIAN CANCER: ICD-10-CM

## 2025-01-30 DIAGNOSIS — C73 PAPILLARY CARCINOMA, FOLLICULAR VARIANT (HCC): ICD-10-CM

## 2025-01-30 LAB
THYROGLOB AB SERPL-ACNC: <1 IU/ML (ref 0–0.9)
THYROGLOB SERPL-MCNC: 0.1 NG/ML (ref 1.5–38.5)

## 2025-01-30 PROCEDURE — 76856 US EXAM PELVIC COMPLETE: CPT

## 2025-01-30 PROCEDURE — 76830 TRANSVAGINAL US NON-OB: CPT

## 2025-02-01 ENCOUNTER — RESULTS FOLLOW-UP (OUTPATIENT)
Dept: OBGYN CLINIC | Facility: CLINIC | Age: 70
End: 2025-02-01

## 2025-02-05 ENCOUNTER — RESULTS FOLLOW-UP (OUTPATIENT)
Dept: OBGYN CLINIC | Facility: CLINIC | Age: 70
End: 2025-02-05

## 2025-02-11 NOTE — ASSESSMENT & PLAN NOTE
"Discussed with the patient the lack of screening tests available for ovarian cancer. No data to support pelvic ultrasound or ca-125 \"screening\" as a method to detect disease earlier or change prognosis after diagnosis. Recommend awareness of symptoms and to contact for new persistent abdominal or pelvic pain, new progressive bloating, problems eating (early satiety, getting full too fast), or constant urinary pressure.  Will discontinue annual u/s and ca125.  Agrees to plan.       "

## 2025-02-11 NOTE — PROGRESS NOTES
"Name: Lisseth Gil      : 1955      MRN: [id number]  Encounter Provider: Kizzy Ritchie MD  Encounter Date: 2025   Encounter department: [facility] [place]  :  Assessment & Plan  Encounter for gynecological examination (general) (routine) without abnormal findings  Here for well check, no breast or gyn complaints.  Normal breast and pelvic exams s/p hysterectomy.  Mammo order given, last 25 BIRADS 1B  Colonoscopy , due   Dexa 10/2022 WNL; repeat 5-7 years. Calcium recs reviewed.         History of right ovarian cyst - stable  Asymptomatic, normal exam, stable imaging.     25 u/s Simple cysts stable in area of vaginal cuff, 8 and 5 mm. Ovaries not visualized, no free fluid.  25 normal        Family history of ovarian cancer - mother in 60s  Discussed with the patient the lack of screening tests available for ovarian cancer. No data to support pelvic ultrasound or ca-125 \"screening\" as a method to detect disease earlier or change prognosis after diagnosis. Recommend awareness of symptoms and to contact for new persistent abdominal or pelvic pain, new progressive bloating, problems eating (early satiety, getting full too fast), or constant urinary pressure.  Will discontinue annual u/s and ca125.  Agrees to plan.       [facility] negative - [facility]    Orders:    Mammo screening bilateral w 3d and cad; Future    Personal history of malignant neoplasm of thyroid         Breast cancer screening by mammogram    Orders:    Mammo screening bilateral w 3d and cad; Future        History of Present Illness   HPI  Lisseth Gil is a 69 y.o. female who presents for Medicare biannual breast and pelvic exams.    Review of Systems  No breast, bladder, bowel changes. No new persistent pain, bloating, early satiety or pelvic pressure    Past Medical History   Past Medical History:   Diagnosis Date    Diabetes mellitus (HCC)     Type II    Hyperlipidemia     " Hypertension      Past Surgical History:   Procedure Laterality Date    COLONOSCOPY  06/2021    Due 2026    HYSTERECTOMY      [facility]    LARYNGOSCOPY N/A 5/23/2023    Procedure: PRE-INDUCTION AWAKE NASAL FIBEROPTIC LARYNGOSCOPY;  Surgeon: Domi Nur MD;  [facility];  Service: Surgical Oncology    MAMMO (HISTORICAL) Bilateral 12/13/2021    [facility]    THYROIDECTOMY N/A 5/23/2023    Procedure: TOTAL THYROIDECTOMY;  Surgeon: Domi Nur MD;  [facility];  Service: Surgical Oncology    US GUIDED THYROID BIOPSY  4/4/2023     Family History   Problem Relation Age of Onset    Diabetes Mother     Ovarian cancer Mother 60    Kidney failure Father     Heart disease Sister     No Known Problems Sister     No Known Problems Sister     No Known Problems Sister     No Known Problems Sister     Diabetes Maternal Grandmother     Hypertension Maternal Grandmother     Diabetes Maternal Grandfather     Hypertension Maternal Grandfather     No Known Problems Paternal Grandmother     No Known Problems Paternal Grandfather     Colon cancer Brother 45    No Known Problems Brother     No Known Problems Brother     No Known Problems Brother     No Known Problems Son     No Known Problems Son     No Known Problems Maternal Aunt     No Known Problems Maternal Aunt     No Known Problems Maternal Aunt     No Known Problems Maternal Aunt     No Known Problems Maternal Aunt     No Known Problems Maternal Aunt     No Known Problems Paternal Aunt     No Known Problems Paternal Aunt     Breast cancer Neg Hx     Uterine cancer Neg Hx       reports that she has never smoked. She has never used smokeless tobacco. She reports that she does not currently use alcohol. She reports that she does not use drugs.  Current Outpatient Medications on File Prior to Visit   Medication Sig Dispense Refill    cyanocobalamin 1,000 mcg/mL INJECT 1000 MCG INTRAMUSCULARLY MONTHLY      levothyroxine 112 mcg tablet Take 1 tablet (112 mcg total) by  "mouth daily 90 tablet 1    losartan (COZAAR) 50 mg tablet       metFORMIN (GLUCOPHAGE-XR) 750 mg 24 hr tablet every 24 hours      omega-3-acid ethyl esters (LOVAZA) 1 g capsule Every 12 hours      Rosuvastatin Calcium 10 MG CPSP every 24 hours      Semaglutide,0.25 or 0.5MG/DOS, (Ozempic, 0.25 or 0.5 MG/DOSE,) 2 MG/1.5ML SOPN 0.5 mg every 7 days Saturday       No current facility-administered medications on file prior to visit.   No Known Allergies      Objective   /60 (BP Location: Left arm, Patient Position: Sitting, Cuff Size: Standard)   Ht 5' 0.5\" (1.537 m)   Wt 57.1 kg (125 lb 12.8 oz)   BMI 24.16 kg/m²      Physical Exam  General appearance: no distress, pleasant  Neck: thyroid without nodules or thyromegaly, no palpable adenopathy  Lymph nodes: no palpable adenopathy  Breasts: no masses, nodes or skin changes  Abdomen: soft, non tender, no palpable masses  Pelvic exam: normal atrophic external genitalia with stable soft 3 cm right Bartholin's cyst, urethral meatus normal, vagina atrophic without lesions, cuff intact, no adnexal masses, non tender  Rectal exam: normal sphincter tone, no masses, RV confirms above        "

## 2025-02-11 NOTE — ASSESSMENT & PLAN NOTE
Asymptomatic, normal exam, stable imaging.     1/30/25 u/s Simple cysts stable in area of vaginal cuff, 8 and 5 mm. Ovaries not visualized, no free fluid.  1/29/25 normal

## 2025-02-11 NOTE — ASSESSMENT & PLAN NOTE
Here for well check, no breast or gyn complaints.  Normal breast and pelvic exams s/p hysterectomy.  Mammo order given, last 1/29/25 BIRADS 1B  Colonoscopy 2021, due 2026  Dexa 10/2022 WNL; repeat 5-7 years. Calcium recs reviewed.

## 2025-02-14 ENCOUNTER — ANNUAL EXAM (OUTPATIENT)
Dept: OBGYN CLINIC | Facility: CLINIC | Age: 70
End: 2025-02-14
Payer: COMMERCIAL

## 2025-02-14 VITALS
DIASTOLIC BLOOD PRESSURE: 60 MMHG | BODY MASS INDEX: 23.75 KG/M2 | SYSTOLIC BLOOD PRESSURE: 100 MMHG | WEIGHT: 125.8 LBS | HEIGHT: 61 IN

## 2025-02-14 DIAGNOSIS — Z12.31 BREAST CANCER SCREENING BY MAMMOGRAM: ICD-10-CM

## 2025-02-14 DIAGNOSIS — Z01.419 ENCOUNTER FOR GYNECOLOGICAL EXAMINATION (GENERAL) (ROUTINE) WITHOUT ABNORMAL FINDINGS: Primary | ICD-10-CM

## 2025-02-14 DIAGNOSIS — Z13.71 BRCA NEGATIVE: ICD-10-CM

## 2025-02-14 DIAGNOSIS — Z80.41 FAMILY HISTORY OF OVARIAN CANCER: ICD-10-CM

## 2025-02-14 DIAGNOSIS — Z85.850 PERSONAL HISTORY OF MALIGNANT NEOPLASM OF THYROID: ICD-10-CM

## 2025-02-14 DIAGNOSIS — Z87.42 HISTORY OF OVARIAN CYST: ICD-10-CM

## 2025-02-14 PROCEDURE — G0101 CA SCREEN;PELVIC/BREAST EXAM: HCPCS | Performed by: OBSTETRICS & GYNECOLOGY

## 2025-02-14 NOTE — LETTER
"2025     Emeli Jha PA-C  [address] [place] [id number]    Patient: Lisseth Gil   YOB: 1955   Date of Visit: 2025       Dear Emeli:    Thank you for referring Lisseth Gil to me for evaluation. Below are my notes for this consultation.    If you have questions, please do not hesitate to call me. I look forward to following your patient along with you.         Sincerely,        Kizzy Ritchie MD        CC: No Recipients    Kizzy Ritchie MD  2025  3:10 PM  Sign when Signing Visit  Name: Lisseth Gil      : 1955      MRN: [id number]  Encounter Provider: Kizzy Ritchie MD  Encounter Date: 2025   Encounter department: [facility] [place]  :  Assessment & Plan  Encounter for gynecological examination (general) (routine) without abnormal findings  Here for well check, no breast or gyn complaints.  Normal breast and pelvic exams s/p hysterectomy.  Mammo order given, last 25 BIRADS 1B  Colonoscopy , due   Dexa 10/2022 WNL; repeat 5-7 years. Calcium recs reviewed.         History of right ovarian cyst - stable  Asymptomatic, normal exam, stable imaging.     25 u/s Simple cysts stable in area of vaginal cuff, 8 and 5 mm. Ovaries not visualized, no free fluid.  25 normal        Family history of ovarian cancer - mother in 60s  Discussed with the patient the lack of screening tests available for ovarian cancer. No data to support pelvic ultrasound or ca-125 \"screening\" as a method to detect disease earlier or change prognosis after diagnosis. Recommend awareness of symptoms and to contact for new persistent abdominal or pelvic pain, new progressive bloating, problems eating (early satiety, getting full too fast), or constant urinary pressure.  Will discontinue annual u/s and ca125.  Agrees to plan.       BRCA negative - Negative [facility] 2018    Orders:  •  Mammo screening bilateral w 3d and cad; Future    Personal history " of malignant neoplasm of thyroid         Breast cancer screening by mammogram    Orders:  •  Mammo screening bilateral w 3d and cad; Future        History of Present Illness  HPI  Lisseth Gil is a 69 y.o. female who presents for Medicare biannual breast and pelvic exams.    Review of Systems  No breast, bladder, bowel changes. No new persistent pain, bloating, early satiety or pelvic pressure    Past Medical History  Past Medical History:   Diagnosis Date   • Diabetes mellitus (HCC)     Type II   • Hyperlipidemia    • Hypertension      Past Surgical History:   Procedure Laterality Date   • COLONOSCOPY  06/2021    Due 2026   • HYSTERECTOMY      [facility]   • LARYNGOSCOPY N/A 5/23/2023    Procedure: PRE-INDUCTION AWAKE NASAL FIBEROPTIC LARYNGOSCOPY;  Surgeon: Domi Nur MD;  Location: [facility];  Service: Surgical Oncology   • MAMMO (HISTORICAL) Bilateral 12/13/2021    [facility]   • THYROIDECTOMY N/A 5/23/2023    Procedure: TOTAL THYROIDECTOMY;  Surgeon: Domi Nur MD;  Location: [facility];  Service: Surgical Oncology   • US GUIDED THYROID BIOPSY  4/4/2023     Family History   Problem Relation Age of Onset   • Diabetes Mother    • Ovarian cancer Mother 60   • Kidney failure Father    • Heart disease Sister    • No Known Problems Sister    • No Known Problems Sister    • No Known Problems Sister    • No Known Problems Sister    • Diabetes Maternal Grandmother    • Hypertension Maternal Grandmother    • Diabetes Maternal Grandfather    • Hypertension Maternal Grandfather    • No Known Problems Paternal Grandmother    • No Known Problems Paternal Grandfather    • Colon cancer Brother 45   • No Known Problems Brother    • No Known Problems Brother    • No Known Problems Brother    • No Known Problems Son    • No Known Problems Son    • No Known Problems Maternal Aunt    • No Known Problems Maternal Aunt    • No Known Problems Maternal Aunt    • No Known Problems Maternal Aunt    • No Known Problems Maternal Aunt    •  "No Known Problems Maternal Aunt    • No Known Problems Paternal Aunt    • No Known Problems Paternal Aunt    • Breast cancer Neg Hx    • Uterine cancer Neg Hx       reports that she has never smoked. She has never used smokeless tobacco. She reports that she does not currently use alcohol. She reports that she does not use drugs.  Current Outpatient Medications on File Prior to Visit   Medication Sig Dispense Refill   • cyanocobalamin 1,000 mcg/mL INJECT 1000 MCG INTRAMUSCULARLY MONTHLY     • levothyroxine 112 mcg tablet Take 1 tablet (112 mcg total) by mouth daily 90 tablet 1   • losartan (COZAAR) 50 mg tablet      • metFORMIN (GLUCOPHAGE-XR) 750 mg 24 hr tablet every 24 hours     • omega-3-acid ethyl esters (LOVAZA) 1 g capsule Every 12 hours     • Rosuvastatin Calcium 10 MG CPSP every 24 hours     • Semaglutide,0.25 or 0.5MG/DOS, (Ozempic, 0.25 or 0.5 MG/DOSE,) 2 MG/1.5ML SOPN 0.5 mg every 7 days Saturday       No current facility-administered medications on file prior to visit.   No Known Allergies      Objective  /60 (BP Location: Left arm, Patient Position: Sitting, Cuff Size: Standard)   Ht 5' 0.5\" (1.537 m)   Wt 57.1 kg (125 lb 12.8 oz)   BMI 24.16 kg/m²      Physical Exam  General appearance: no distress, pleasant  Neck: thyroid without nodules or thyromegaly, no palpable adenopathy  Lymph nodes: no palpable adenopathy  Breasts: no masses, nodes or skin changes  Abdomen: soft, non tender, no palpable masses  Pelvic exam: normal atrophic external genitalia with stable soft 3 cm right Bartholin's cyst, urethral meatus normal, vagina atrophic without lesions, cuff intact, no adnexal masses, non tender  Rectal exam: normal sphincter tone, no masses, RV confirms above          "

## 2025-02-14 NOTE — PATIENT INSTRUCTIONS
Return to office in one year unless having any problems such as breast changes, bleeding, new persistent pain, new progressive bloating, new problems eating (getting full to quickly) or new constant urinary pressure that does not resolve in one week.    Continue to strive for 1200 mg of calcium and 1000 IU of vitamin D daily in diet and supplements combined for your bone health.  You can only absorb 600 mg of calcium at a time. Avoid excess calcium as this may adversely effect your heart.  There are 400 mg of calcium in an 8 oz serving of dairy.  [place] milk has 600 mg of calcium in an 8 oz serving.

## (undated) DEVICE — NERVE STIMULATOR HAND HELD

## (undated) DEVICE — ADHESIVE SKIN HIGH VISCOSITY EXOFIN 1ML

## (undated) DEVICE — MINOR PROCEDURE DRAPE: Brand: CONVERTORS

## (undated) DEVICE — GLOVE SRG BIOGEL 6.5

## (undated) DEVICE — SUT VICRYL 3-0 SH 27 IN J416H

## (undated) DEVICE — SURGICEL 4 X 8

## (undated) DEVICE — SUT SILK 3-0 SH CR/8 18 IN C013D

## (undated) DEVICE — POOLE SUCTION HANDLE: Brand: CARDINAL HEALTH

## (undated) DEVICE — HOOK ELASTIC STAY 12MM BLUNT SNGL STRL

## (undated) DEVICE — PACK UNIVERSAL NECK

## (undated) DEVICE — SUT SILK 2-0 SH CR/8 18 IN C012D

## (undated) DEVICE — ANTIBACTERIAL UNDYED BRAIDED (POLYGLACTIN 910), SYNTHETIC ABSORBABLE SUTURE: Brand: COATED VICRYL

## (undated) DEVICE — ASCOPE 4 RHINOLARYNGO SLIM: Brand: ASCOPE™ 4 RHINOLARYNGO SLIM

## (undated) DEVICE — ROSEBUD DISSECTORS: Brand: DEROYAL

## (undated) DEVICE — SUT SILK 3-0 18 IN A184H

## (undated) DEVICE — 3M™ STERI-STRIP™ REINFORCED ADHESIVE SKIN CLOSURES, R1541, 1/4 IN X 3 IN (6 MM X 75 MM), 3 STRIPS/ENVELOPE: Brand: 3M™ STERI-STRIP™

## (undated) DEVICE — INTENDED FOR TISSUE SEPARATION, AND OTHER PROCEDURES THAT REQUIRE A SHARP SURGICAL BLADE TO PUNCTURE OR CUT.: Brand: BARD-PARKER SAFETY BLADES SIZE 15, STERILE

## (undated) DEVICE — SUT STRATAFIX SPIRAL 4-0 PGA/PCL 30 X 30 CM SXMD2B409

## (undated) DEVICE — ELECTRODE BLADE MOD E-Z CLEAN 2.5IN 6.4CM -0012M

## (undated) DEVICE — 3M™ STERI-STRIP™ REINFORCED ADHESIVE SKIN CLOSURES, R1547, 1/2 IN X 4 IN (12 MM X 100 MM), 6 STRIPS/ENVELOPE: Brand: 3M™ STERI-STRIP™

## (undated) DEVICE — SUT SILK 2-0 18 IN A185H

## (undated) DEVICE — HARMONIC 1100 SHEARS, 20CM SHAFT LENGTH: Brand: HARMONIC